# Patient Record
Sex: MALE | Race: BLACK OR AFRICAN AMERICAN | URBAN - METROPOLITAN AREA
[De-identification: names, ages, dates, MRNs, and addresses within clinical notes are randomized per-mention and may not be internally consistent; named-entity substitution may affect disease eponyms.]

---

## 2017-04-13 ENCOUNTER — OFFICE VISIT (OUTPATIENT)
Dept: INTERNAL MEDICINE CLINIC | Age: 47
End: 2017-04-13

## 2017-04-13 VITALS
HEIGHT: 62 IN | WEIGHT: 177 LBS | SYSTOLIC BLOOD PRESSURE: 153 MMHG | HEART RATE: 72 BPM | TEMPERATURE: 98 F | RESPIRATION RATE: 16 BRPM | DIASTOLIC BLOOD PRESSURE: 88 MMHG | BODY MASS INDEX: 32.57 KG/M2 | OXYGEN SATURATION: 95 %

## 2017-04-13 DIAGNOSIS — Z00.00 ROUTINE GENERAL MEDICAL EXAMINATION AT A HEALTH CARE FACILITY: ICD-10-CM

## 2017-04-13 DIAGNOSIS — E78.2 MIXED HYPERLIPIDEMIA: ICD-10-CM

## 2017-04-13 DIAGNOSIS — I10 ESSENTIAL HYPERTENSION: ICD-10-CM

## 2017-04-13 DIAGNOSIS — Z76.89 ENCOUNTER TO ESTABLISH CARE: Primary | ICD-10-CM

## 2017-04-13 DIAGNOSIS — Q53.10 UNDESCENDED RIGHT TESTICLE: ICD-10-CM

## 2017-04-13 DIAGNOSIS — N50.811 TESTICULAR PAIN, RIGHT: ICD-10-CM

## 2017-04-13 DIAGNOSIS — E55.9 VITAMIN D DEFICIENCY: ICD-10-CM

## 2017-04-13 RX ORDER — ATORVASTATIN CALCIUM 10 MG/1
10 TABLET, FILM COATED ORAL DAILY
Qty: 30 TAB | Refills: 5 | Status: CANCELLED | OUTPATIENT
Start: 2017-04-13

## 2017-04-13 NOTE — PATIENT INSTRUCTIONS
High Blood Pressure: Care Instructions  Your Care Instructions  If your blood pressure is usually above 140/90, you have high blood pressure, or hypertension. That means the top number is 140 or higher or the bottom number is 90 or higher, or both. Despite what a lot of people think, high blood pressure usually doesn't cause headaches or make you feel dizzy or lightheaded. It usually has no symptoms. But it does increase your risk for heart attack, stroke, and kidney or eye damage. The higher your blood pressure, the more your risk increases. Your doctor will give you a goal for your blood pressure. Your goal will be based on your health and your age. An example of a goal is to keep your blood pressure below 140/90. Lifestyle changes, such as eating healthy and being active, are always important to help lower blood pressure. You might also take medicine to reach your blood pressure goal.  Follow-up care is a key part of your treatment and safety. Be sure to make and go to all appointments, and call your doctor if you are having problems. It's also a good idea to know your test results and keep a list of the medicines you take. How can you care for yourself at home? Medical treatment  · If you stop taking your medicine, your blood pressure will go back up. You may take one or more types of medicine to lower your blood pressure. Be safe with medicines. Take your medicine exactly as prescribed. Call your doctor if you think you are having a problem with your medicine. · Talk to your doctor before you start taking aspirin every day. Aspirin can help certain people lower their risk of a heart attack or stroke. But taking aspirin isn't right for everyone, because it can cause serious bleeding. · See your doctor regularly. You may need to see the doctor more often at first or until your blood pressure comes down.   · If you are taking blood pressure medicine, talk to your doctor before you take decongestants or anti-inflammatory medicine, such as ibuprofen. Some of these medicines can raise blood pressure. · Learn how to check your blood pressure at home. Lifestyle changes  · Stay at a healthy weight. This is especially important if you put on weight around the waist. Losing even 10 pounds can help you lower your blood pressure. · If your doctor recommends it, get more exercise. Walking is a good choice. Bit by bit, increase the amount you walk every day. Try for at least 30 minutes on most days of the week. You also may want to swim, bike, or do other activities. · Avoid or limit alcohol. Talk to your doctor about whether you can drink any alcohol. · Try to limit how much sodium you eat to less than 2,300 milligrams (mg) a day. Your doctor may ask you to try to eat less than 1,500 mg a day. · Eat plenty of fruits (such as bananas and oranges), vegetables, legumes, whole grains, and low-fat dairy products. · Lower the amount of saturated fat in your diet. Saturated fat is found in animal products such as milk, cheese, and meat. Limiting these foods may help you lose weight and also lower your risk for heart disease. · Do not smoke. Smoking increases your risk for heart attack and stroke. If you need help quitting, talk to your doctor about stop-smoking programs and medicines. These can increase your chances of quitting for good. When should you call for help? Call 911 anytime you think you may need emergency care. This may mean having symptoms that suggest that your blood pressure is causing a serious heart or blood vessel problem. Your blood pressure may be over 180/110. For example, call 911 if:  · You have symptoms of a heart attack. These may include:  ¨ Chest pain or pressure, or a strange feeling in the chest.  ¨ Sweating. ¨ Shortness of breath. ¨ Nausea or vomiting. ¨ Pain, pressure, or a strange feeling in the back, neck, jaw, or upper belly or in one or both shoulders or arms.   ¨ Lightheadedness or sudden weakness. ¨ A fast or irregular heartbeat. · You have symptoms of a stroke. These may include:  ¨ Sudden numbness, tingling, weakness, or loss of movement in your face, arm, or leg, especially on only one side of your body. ¨ Sudden vision changes. ¨ Sudden trouble speaking. ¨ Sudden confusion or trouble understanding simple statements. ¨ Sudden problems with walking or balance. ¨ A sudden, severe headache that is different from past headaches. · You have severe back or belly pain. Do not wait until your blood pressure comes down on its own. Get help right away. Call your doctor now or seek immediate care if:  · Your blood pressure is much higher than normal (such as 180/110 or higher), but you don't have symptoms. · You think high blood pressure is causing symptoms, such as:  ¨ Severe headache. ¨ Blurry vision. Watch closely for changes in your health, and be sure to contact your doctor if:  · Your blood pressure measures 140/90 or higher at least 2 times. That means the top number is 140 or higher or the bottom number is 90 or higher, or both. · You think you may be having side effects from your blood pressure medicine. · Your blood pressure is usually normal, but it goes above normal at least 2 times. Where can you learn more? Go to http://amaris-jay.info/. Enter L118 in the search box to learn more about \"High Blood Pressure: Care Instructions. \"  Current as of: August 8, 2016  Content Version: 11.2  © 3921-8810 [employer]. Care instructions adapted under license by [employer] (which disclaims liability or warranty for this information). If you have questions about a medical condition or this instruction, always ask your healthcare professional. [place] [address] any warranty or liability for your use of this information.        High Cholesterol: Care Instructions  Your Care Instructions  Cholesterol is a type of fat in your blood. It is needed for many body functions, such as making new cells. Cholesterol is made by your body. It also comes from food you eat. High cholesterol means that you have too much of the fat in your blood. This raises your risk of a heart attack and stroke. LDL and HDL are part of your total cholesterol. LDL is the \"bad\" cholesterol. High LDL can raise your risk for heart disease, heart attack, and stroke. HDL is the \"good\" cholesterol. It helps clear bad cholesterol from the body. High HDL is linked with a lower risk of heart disease, heart attack, and stroke. Your cholesterol levels help your doctor find out your risk for having a heart attack or stroke. You and your doctor can talk about whether you need to lower your risk and what treatment is best for you. A heart-healthy lifestyle along with medicines can help lower your cholesterol and your risk. The way you choose to lower your risk will depend on how high your risk is for heart attack and stroke. It will also depend on how you feel about taking medicines. Follow-up care is a key part of your treatment and safety. Be sure to make and go to all appointments, and call your doctor if you are having problems. It's also a good idea to know your test results and keep a list of the medicines you take. How can you care for yourself at home? · Eat a variety of foods every day. Good choices include fruits, vegetables, whole grains (like oatmeal), dried beans and peas, nuts and seeds, soy products (like tofu), and fat-free or low-fat dairy products. · Replace butter, margarine, and hydrogenated or partially hydrogenated oils with olive and canola oils. (Canola oil margarine without trans fat is fine.)  · Replace red meat with fish, poultry, and soy protein (like tofu). · Limit processed and packaged foods like chips, crackers, and cookies. · Bake, broil, or steam foods. Don't hill them. · Be physically active.  Get at least 30 minutes of exercise on most days of the week. Walking is a good choice. You also may want to do other activities, such as running, swimming, cycling, or playing tennis or team sports. · Stay at a healthy weight or lose weight by making the changes in eating and physical activity listed above. Losing just a small amount of weight, even 5 to 10 pounds, can reduce your risk for having a heart attack or stroke. · Do not smoke. When should you call for help? Watch closely for changes in your health, and be sure to contact your doctor if:  · You need help making lifestyle changes. · You have questions about your medicine. Where can you learn more? Go to http://amaris-jay.info/. Enter I671 in the search box to learn more about \"High Cholesterol: Care Instructions. \"  Current as of: January 27, 2016  Content Version: 11.2  © 4581-5948 [employer]. Care instructions adapted under license by [employer] (which disclaims liability or warranty for this information). If you have questions about a medical condition or this instruction, always ask your healthcare professional. [address] any warranty or liability for your use of this information.

## 2017-04-13 NOTE — LETTER
4/20/2017 8:04 AM 
 
Mr. Cat [address] [place] [id number] Dear Tab Hinds: 
 
Please find your most recent results below. Resulted Orders LIPID PANEL Result Value Ref Range Cholesterol, total 214 (H) 100 - 199 mg/dL Triglyceride 93 0 - 149 mg/dL HDL Cholesterol 52 >39 mg/dL VLDL, calculated 19 5 - 40 mg/dL LDL, calculated 143 (H) 0 - 99 mg/dL METABOLIC PANEL, COMPREHENSIVE Result Value Ref Range Glucose 93 65 - 99 mg/dL BUN 7 6 - 24 mg/dL Creatinine 0.81 0.76 - 1.27 mg/dL GFR est non- >59 mL/min/1.73 GFR est  >59 mL/min/1.73  
 BUN/Creatinine ratio 9 9 - 20 Sodium 142 134 - 144 mmol/L Potassium 4.9 3.5 - 5.2 mmol/L Chloride 100 96 - 106 mmol/L  
 CO2 23 18 - 29 mmol/L Calcium 9.9 8.7 - 10.2 mg/dL Protein, total 6.9 6.0 - 8.5 g/dL Albumin 4.5 3.5 - 5.5 g/dL GLOBULIN, TOTAL 2.4 1.5 - 4.5 g/dL A-G Ratio 1.9 1.2 - 2.2 Bilirubin, total 0.3 0.0 - 1.2 mg/dL Alk. phosphatase 70 39 - 117 IU/L  
 AST (SGOT) 24 0 - 40 IU/L  
 ALT (SGPT) 21 0 - 44 IU/L  
   
   
CBC WITH AUTOMATED DIFF Result Value Ref Range WBC 5.9 3.4 - 10.8 x10E3/uL  
 RBC 4.59 4.14 - 5.80 x10E6/uL HGB 13.3 12.6 - 17.7 g/dL HCT 40.5 37.5 - 51.0 % MCV 88 79 - 97 fL  
 MCH 29.0 26.6 - 33.0 pg  
 MCHC 32.8 31.5 - 35.7 g/dL  
 RDW 14.2 12.3 - 15.4 % PLATELET 560 191 - 801 x10E3/uL NEUTROPHILS 66 % Lymphocytes 26 % MONOCYTES 5 % EOSINOPHILS 3 % BASOPHILS 0 %  
 ABS. NEUTROPHILS 3.9 1.4 - 7.0 x10E3/uL Abs Lymphocytes 1.5 0.7 - 3.1 x10E3/uL  
 ABS. MONOCYTES 0.3 0.1 - 0.9 x10E3/uL  
 ABS. EOSINOPHILS 0.2 0.0 - 0.4 x10E3/uL  
 ABS. BASOPHILS 0.0 0.0 - 0.2 x10E3/uL IMMATURE GRANULOCYTES 0 %  
 ABS. IMM. GRANS. 0.0 0.0 - 0.1 x10E3/uL PSA SCREENING (SCREENING) Result Value Ref Range Prostate Specific Ag 0.2 0.0 - 4.0 ng/mL TSH AND FREE T4 Result Value Ref Range TSH 1.280 0.450 - 4.500 uIU/mL T4, Free 1.05 0.82 - 1.77 ng/dL VITAMIN D, 25 HYDROXY Result Value Ref Range VITAMIN D, 25-HYDROXY 24.5 (L) 30.0 - 100.0 ng/mL HEMOGLOBIN A1C WITH EAG Result Value Ref Range Hemoglobin A1c 6.3 (H) 4.8 - 5.6 % Comment:  
            Pre-diabetes: 5.7 - 6.4 Diabetes: >6.4 Glycemic control for adults with diabetes: <7.0 Estimated average glucose 134 mg/dL CVD REPORT  
    
     
     
     
   
   
 
 
RECOMMENDATIONS: 
Your labs showed normal kidney and liver tests, blood counts, thyroid, and prostate blood test. Your diabetes screening showed you have prediabetes, meaning you are at increased risk of diabetes.  
 
 Your total cholesterol was also high. To decrease your risk of getting full-blown diabetes and to lower your cholesterol, work on diet, exercise, and weight loss if you are overweight. For diet, increase fruits, vegetables, and low- fat dairy products, and decrease sweets, soft drinks, juices, and foods high in carbohydrates like white bread, white rice, potatoes, crackers, potato chips. Also decrease fried foods, fast foods, and red meats such as beef, tyler, sausage, hot dogs, and pork. For exercise, aim for 30 minutes of activity 5 days a week. Your vitamin D level is low but improving. Try to get foods high in vitamin D like tuna, mackerel, salmon, milk, yogurt. Please call me if you have any questions: [phone] Sincerely, Jerry Valdez LPN

## 2017-04-13 NOTE — PROGRESS NOTES
Reviewed record  In preparation for visit and have obtained necessary documentation. 1. Have you been to the ER, urgent care clinic since your last visit? Hospitalized since your last visit?no  2. Have you seen or consulted any other health care providers outside of the [address] [place] [address] since your last visit? Include any pap smears or colon screening. No    Patient does not currently have advance directives. Patient given information on advance directives and brochure and printed blank advance directive form made available to patient. Patient is also asked to make copy of directives available to this office for our/[facility] records once advanced directives are completed.

## 2017-04-13 NOTE — PROGRESS NOTES
CC:  Chief Complaint   Patient presents with    Establish Care    Testicle Pain    Elevated Blood Pressure     HISTORY OF PRESENT ILLNESS  Maryellen Abraham is a 56 y.o. male. Presents to [facility]. Previously followed by Dr. Adia Alford and Dale Antonio NP at this clinic. Last seen in 3/15. He has HTN and hyperlipidemia. He is presently on no medication. Today he complains of 2 yr history of testicular pain. Presently mostly on the right side. Constant, 4/10. No known exacerbating or alleviating factors. Cardiovascular Review  The patient has hypertension and hyperlipidemia. States he has never been on blood pressure medication,  Was previously on atorvastatin; tolerated with no side effects. Diet and Lifestyle: not attempting to follow a low fat, low cholesterol diet, not attempting to follow a low sodium diet, no formal exercise but active during the day. Lab review: orders written for new lab studies as appropriate; see orders. Fam Hx: Had a brother who  of a heart attack at age 45     Soc Hx  . Has 2 daughters. Lives alone in a house. Works as a . Never smoker. Drinks beer occasionally. Denies recreational drug use. Drinks occasional energy drinks.     ROS  Constitutional: negative for fevers, chills, night sweats  ENT:   negative for sore throat, nasal congestion, ear pains, hoarseness  Respiratory:  negative for cough, hemoptysis, dyspnea,wheezing  CV:   negative for chest pain, palpitations, lower extremity edema  GI:   negative for heartburn, abd pain, nausea, vomiting, diarrhea, constipation  Genitourinary: negative for frequency, dysuria and hematuria  Integument:  negative for rash and pruritus  Musculoskel: negative for myalgias, arthralgias, back pain, muscle weakness, joint pain  Neurological:  negative for headaches, dizziness, vertigo, gait problems  Behavl/Psych: negative for feelings of anxiety, depression, mood change     Patient Active Problem List   Diagnosis Code    FH: CAD (coronary artery disease) Z82.49    Undescended right testicle Q53.10    Hyperlipidemia E78.5    Vitamin D deficiency E55.9     Past Medical History:   Diagnosis Date    FH: CAD (coronary artery disease) 10/17/2011    Undescended right testicle 10/17/2011     No Known Allergies  Current Outpatient Prescriptions   Medication Sig Dispense Refill    aspirin 81 mg chewable tablet Take 81 mg by mouth daily. On no medications. PHYSICAL EXAM  Visit Vitals    /88    Pulse 72    Temp 98 °F (36.7 °C) (Oral)    Resp 16    Ht 5' 2\" (1.575 m)    Wt 177 lb (80.3 kg)    SpO2 95%    BMI 32.37 kg/m2   8 lb wt gain since 3/6/15. General: Obese, no distress. HEENT:  Head normocephalic/atraumatic, no scleral icterus  Lungs:  Clear to ausculation bilaterally. Good air movement. Heart:  Regular rate and rhythm, normal S1 and S2, no murmur, gallop, or rub  Extremities: No clubbing, cyanosis, or edema. : Undescended right testicle. Normal penis. Neurological: Alert and oriented. Psychiatric: Normal mood and affect. Behavior is normal.         ASSESSMENT AND PLAN    ICD-10-CM ICD-9-CM    1. Encounter to establish care Z76.89 V65.8    2. Routine general medical examination at a health care facility Z00.00 V70.0 LIPID PANEL      METABOLIC PANEL, COMPREHENSIVE      CBC WITH AUTOMATED DIFF      PSA SCREENING (SCREENING)      TSH AND FREE T4      HEMOGLOBIN A1C WITH EAG   3. Essential hypertension I10 401.9    4. Mixed hyperlipidemia E78.2 272.2    5. Testicular pain, right N50.811 608.9 REFERRAL TO UROLOGY   6. Undescended right testicle Q53.10 752.51    7. Vitamin D deficiency E55.9 268.9 VITAMIN D, 25 HYDROXY       Ranjan Colindres was seen today for establish care, testicle pain and elevated blood pressure.     Diagnoses and all orders for this visit:    Encounter to establish care    Routine general medical examination at a health care facility  -     LIPID PANEL  -     METABOLIC PANEL, COMPREHENSIVE  -     CBC WITH AUTOMATED DIFF  -     PSA SCREENING (SCREENING)  -     TSH AND FREE T4  -     HEMOGLOBIN A1C WITH EAG    Essential hypertension  /88 today. He declines starting on BP medication. Wants to try lifestyle modification first. Patient counseled on low-salt, low-cholesterol diet, exercise, and weight loss. Mixed hyperlipidemia    Testicular pain, right  -     REFERRAL TO UROLOGY    Undescended right testicle    Vitamin D deficiency  -     VITAMIN D, 25 HYDROXY      Follow-up Disposition:  Return in about 3 months (around 7/13/2017), or if symptoms worsen or fail to improve, for HTN, hyperlipidemia. Provided patient and/or family with advanced directive information and answered pertinent questions. Encouraged patient to provide a copy of advanced directive to the office when available. I have discussed the diagnosis with the patient and the intended plan as seen in the above orders. Patient is in agreement. The patient has received an after-visit summary and questions were answered concerning future plans. I have discussed medication side effects and warnings with the patient as well.

## 2017-04-13 NOTE — MR AVS SNAPSHOT
Visit Information Date & Time Provider Department Dept. Phone Encounter #  
 4/13/2017 10:00 AM Catrina UlrichBhavya [phone] [id number] Follow-up Instructions Return in about 3 months (around 7/13/2017), or if symptoms worsen or fail to improve, for HTN, hyperlipidemia. Upcoming Health Maintenance Date Due DTaP/Tdap/Td series (2 - Td) 10/17/2021 Allergies as of 4/13/2017  Review Complete On: 4/13/2017 By: Catrina Ulrich MD  
 No Known Allergies Current Immunizations  Reviewed on 3/16/2015 Name Date Influenza Vaccine Split 10/17/2011 TDAP Vaccine 10/17/2011 Not reviewed this visit You Were Diagnosed With   
  
 Codes Comments Encounter to establish care    -  Primary ICD-10-CM: Z76.89 
ICD-9-CM: V65.8 Routine general medical examination at a health care facility     ICD-10-CM: Z00.00 ICD-9-CM: V70.0 Essential hypertension     ICD-10-CM: I10 
ICD-9-CM: 401.9 Mixed hyperlipidemia     ICD-10-CM: E78.2 ICD-9-CM: 272.2 Testicular pain, right     ICD-10-CM: N50.811 ICD-9-CM: 608.9 Undescended right testicle     ICD-10-CM: Q53.10 ICD-9-CM: 752.51 Vitamin D deficiency     ICD-10-CM: E55.9 ICD-9-CM: 268.9 Vitals BP Pulse Temp Resp Height(growth percentile) Weight(growth percentile) 153/88 72 98 °F (36.7 °C) (Oral) 16 5' 2\" (1.575 m) 177 lb (80.3 kg) SpO2 BMI Smoking Status 95% 32.37 kg/m2 Never Smoker Vitals History BMI and BSA Data Body Mass Index Body Surface Area  
 32.37 kg/m 2 1.87 m 2 Preferred Pharmacy Pharmacy Name Phone [id number] [place] [address] [phone] Your Updated Medication List  
  
   
This list is accurate as of: 4/13/17 10:30 AM.  Always use your most recent med list.  
  
  
  
  
 aspirin 81 mg chewable tablet Take 81 mg by mouth daily. We Performed the Following CBC WITH AUTOMATED DIFF [13600 CPT(R)] HEMOGLOBIN A1C WITH EAG [68843 CPT(R)] LIPID PANEL [84699 CPT(R)] METABOLIC PANEL, COMPREHENSIVE [68998 CPT(R)] PSA SCREENING (SCREENING) [ [facility]] REFERRAL TO UROLOGY [BHO565 Custom] Comments:  
 Chronic testicular pain. Undescended right testicle with ED. TSH AND FREE T4 [89507 CPT(R)] VITAMIN D, 25 HYDROXY [id number] CPT(R)] Follow-up Instructions Return in about 3 months (around 7/13/2017), or if symptoms worsen or fail to improve, for HTN, hyperlipidemia. Referral Information Referral ID Referred By Referred To  
  
 [id number] [facility]ph[facility] [address], [address] [facility] Visits Status Start Date End Date 1 New Request 4/13/17 4/13/18 If your referral has a status of pending review or denied, additional information will be sent to support the outcome of this decision. Patient Instructions High Blood Pressure: Care Instructions Your Care Instructions If your blood pressure is usually above 140/90, you have high blood pressure, or hypertension. That means the top number is 140 or higher or the bottom number is 90 or higher, or both. Despite what a lot of people think, high blood pressure usually doesn't cause headaches or make you feel dizzy or lightheaded. It usually has no symptoms. But it does increase your risk for heart attack, stroke, and kidney or eye damage. The higher your blood pressure, the more your risk increases. Your doctor will give you a goal for your blood pressure. Your goal will be based on your health and your age. An example of a goal is to keep your blood pressure below 140/90. Lifestyle changes, such as eating healthy and being active, are always important to help lower blood pressure.  You might also take medicine to reach your blood pressure goal. 
 Follow-up care is a key part of your treatment and safety. Be sure to make and go to all appointments, and call your doctor if you are having problems. It's also a good idea to know your test results and keep a list of the medicines you take. How can you care for yourself at home? Medical treatment · If you stop taking your medicine, your blood pressure will go back up. You may take one or more types of medicine to lower your blood pressure. Be safe with medicines. Take your medicine exactly as prescribed. Call your doctor if you think you are having a problem with your medicine. · Talk to your doctor before you start taking aspirin every day. Aspirin can help certain people lower their risk of a heart attack or stroke. But taking aspirin isn't right for everyone, because it can cause serious bleeding. · See your doctor regularly. You may need to see the doctor more often at first or until your blood pressure comes down. · If you are taking blood pressure medicine, talk to your doctor before you take decongestants or anti-inflammatory medicine, such as ibuprofen. Some of these medicines can raise blood pressure. · Learn how to check your blood pressure at home. Lifestyle changes · Stay at a healthy weight. This is especially important if you put on weight around the waist. Losing even 10 pounds can help you lower your blood pressure. · If your doctor recommends it, get more exercise. Walking is a good choice. Bit by bit, increase the amount you walk every day. Try for at least 30 minutes on most days of the week. You also may want to swim, bike, or do other activities. · Avoid or limit alcohol. Talk to your doctor about whether you can drink any alcohol. · Try to limit how much sodium you eat to less than 2,300 milligrams (mg) a day. Your doctor may ask you to try to eat less than 1,500 mg a day.  
· Eat plenty of fruits (such as bananas and oranges), vegetables, legumes, whole grains, and low-fat dairy products. · Lower the amount of saturated fat in your diet. Saturated fat is found in animal products such as milk, cheese, and meat. Limiting these foods may help you lose weight and also lower your risk for heart disease. · Do not smoke. Smoking increases your risk for heart attack and stroke. If you need help quitting, talk to your doctor about stop-smoking programs and medicines. These can increase your chances of quitting for good. When should you call for help? Call 911 anytime you think you may need emergency care. This may mean having symptoms that suggest that your blood pressure is causing a serious heart or blood vessel problem. Your blood pressure may be over 180/110. For example, call 911 if: 
· You have symptoms of a heart attack. These may include: ¨ Chest pain or pressure, or a strange feeling in the chest. 
¨ Sweating. ¨ Shortness of breath. ¨ Nausea or vomiting. ¨ Pain, pressure, or a strange feeling in the back, neck, jaw, or upper belly or in one or both shoulders or arms. ¨ Lightheadedness or sudden weakness. ¨ A fast or irregular heartbeat. · You have symptoms of a stroke. These may include: 
¨ Sudden numbness, tingling, weakness, or loss of movement in your face, arm, or leg, especially on only one side of your body. ¨ Sudden vision changes. ¨ Sudden trouble speaking. ¨ Sudden confusion or trouble understanding simple statements. ¨ Sudden problems with walking or balance. ¨ A sudden, severe headache that is different from past headaches. · You have severe back or belly pain. Do not wait until your blood pressure comes down on its own. Get help right away. Call your doctor now or seek immediate care if: 
· Your blood pressure is much higher than normal (such as 180/110 or higher), but you don't have symptoms. · You think high blood pressure is causing symptoms, such as: ¨ Severe headache. ¨ Blurry vision. Watch closely for changes in your health, and be sure to contact your doctor if: 
· Your blood pressure measures 140/90 or higher at least 2 times. That means the top number is 140 or higher or the bottom number is 90 or higher, or both. · You think you may be having side effects from your blood pressure medicine. · Your blood pressure is usually normal, but it goes above normal at least 2 times. Where can you learn more? Go to http://amaris-jay.info/. Enter H619 in the search box to learn more about \"High Blood Pressure: Care Instructions. \" Current as of: August 8, 2016 Content Version: 11.2 © 0871-3026 [employer]. Care instructions adapted under license by [employer] (which disclaims liability or warranty for this information). If you have questions about a medical condition or this instruction, always ask your healthcare professional. [place] [address] any warranty or liability for your use of this information. High Cholesterol: Care Instructions Your Care Instructions Cholesterol is a type of fat in your blood. It is needed for many body functions, such as making new cells. Cholesterol is made by your body. It also comes from food you eat. High cholesterol means that you have too much of the fat in your blood. This raises your risk of a heart attack and stroke. LDL and HDL are part of your total cholesterol. LDL is the \"bad\" cholesterol. High LDL can raise your risk for heart disease, heart attack, and stroke. HDL is the \"good\" cholesterol. It helps clear bad cholesterol from the body. High HDL is linked with a lower risk of heart disease, heart attack, and stroke. Your cholesterol levels help your doctor find out your risk for having a heart attack or stroke. You and your doctor can talk about whether you need to lower your risk and what treatment is best for you.  
A heart-healthy lifestyle along with medicines can help lower your cholesterol and your risk. The way you choose to lower your risk will depend on how high your risk is for heart attack and stroke. It will also depend on how you feel about taking medicines. Follow-up care is a key part of your treatment and safety. Be sure to make and go to all appointments, and call your doctor if you are having problems. It's also a good idea to know your test results and keep a list of the medicines you take. How can you care for yourself at home? · Eat a variety of foods every day. Good choices include fruits, vegetables, whole grains (like oatmeal), dried beans and peas, nuts and seeds, soy products (like tofu), and fat-free or low-fat dairy products. · Replace butter, margarine, and hydrogenated or partially hydrogenated oils with olive and canola oils. (Canola oil margarine without trans fat is fine.) · Replace red meat with fish, poultry, and soy protein (like tofu). · Limit processed and packaged foods like chips, crackers, and cookies. · Bake, broil, or steam foods. Don't hill them. · Be physically active. Get at least 30 minutes of exercise on most days of the week. Walking is a good choice. You also may want to do other activities, such as running, swimming, cycling, or playing tennis or team sports. · Stay at a healthy weight or lose weight by making the changes in eating and physical activity listed above. Losing just a small amount of weight, even 5 to 10 pounds, can reduce your risk for having a heart attack or stroke. · Do not smoke. When should you call for help? Watch closely for changes in your health, and be sure to contact your doctor if: 
· You need help making lifestyle changes. · You have questions about your medicine. Where can you learn more? Go to http://amaris-jay.info/. Enter Z975 in the search box to learn more about \"High Cholesterol: Care Instructions. \" Current as of: January 27, 2016 Content Version: 11.2 © 2236-1160 [employer]. Care instructions adapted under license by [employer] (which disclaims liability or warranty for this information). If you have questions about a medical condition or this instruction, always ask your healthcare professional. [address] any warranty or liability for your use of this information. Introducing [facility]! [employer] introduces [employer] patient portal. Now you can access parts of your medical record, email your doctor's office, and request medication refills online. 1. In your internet browser, go to [email or web address] 2. Click on the First Time User? Click Here link in the Sign In box. You will see the New Member Sign Up page. 3. Enter your HedgeCo Access Code exactly as it appears below. You will not need to use this code after youve completed the sign-up process. If you do not sign up before the expiration date, you must request a new code. · [employer]edgeCo Access Code: [email or web address] Expires: 7/12/2017 10:30 AM 
 
4. Enter the last four digits of your Social Security Number (xxxx) and Date of Birth (mm/dd/yyyy) as indicated and click Submit. You will be taken to the next sign-up page. 5. Create a HedgeCo ID. This will be your HedgeCo login ID and cannot be changed, so think of one that is secure and easy to remember. 6. Create a H[employer] password. You can change your password at any time. 7. Enter your Password Reset Question and Answer. This can be used at a later time if you forget your password. 8. Enter your e-mail address. You will receive e-mail notification when new information is available in [address] 9. Click Sign Up. You can now view and download portions of your medical record. 10. Click the Download Summary menu link to download a portable copy of your medical information.  
 
If you have questions, please visit the Frequently Asked Questions section of the Invrep. Remember, DailyCredhart is NOT to be used for urgent needs. For medical emergencies, dial 911. Now available from your iPhone and Android! Please provide this summary of care documentation to your next provider. Your primary care clinician is listed as Chris Aguilar. If you have any questions after today's visit, please call [phone].

## 2017-04-14 LAB
25(OH)D3+25(OH)D2 SERPL-MCNC: 24.5 NG/ML (ref 30–100)
ALBUMIN SERPL-MCNC: 4.5 G/DL (ref 3.5–5.5)
ALBUMIN/GLOB SERPL: 1.9 {RATIO} (ref 1.2–2.2)
ALP SERPL-CCNC: 70 IU/L (ref 39–117)
ALT SERPL-CCNC: 21 IU/L (ref 0–44)
AST SERPL-CCNC: 24 IU/L (ref 0–40)
BASOPHILS # BLD AUTO: 0 X10E3/UL (ref 0–0.2)
BASOPHILS NFR BLD AUTO: 0 %
BILIRUB SERPL-MCNC: 0.3 MG/DL (ref 0–1.2)
BUN SERPL-MCNC: 7 MG/DL (ref 6–24)
BUN/CREAT SERPL: 9 (ref 9–20)
CALCIUM SERPL-MCNC: 9.9 MG/DL (ref 8.7–10.2)
CHLORIDE SERPL-SCNC: 100 MMOL/L (ref 96–106)
CHOLEST SERPL-MCNC: 214 MG/DL (ref 100–199)
CO2 SERPL-SCNC: 23 MMOL/L (ref 18–29)
CREAT SERPL-MCNC: 0.81 MG/DL (ref 0.76–1.27)
EOSINOPHIL # BLD AUTO: 0.2 X10E3/UL (ref 0–0.4)
EOSINOPHIL NFR BLD AUTO: 3 %
ERYTHROCYTE [DISTWIDTH] IN BLOOD BY AUTOMATED COUNT: 14.2 % (ref 12.3–15.4)
EST. AVERAGE GLUCOSE BLD GHB EST-MCNC: 134 MG/DL
GLOBULIN SER CALC-MCNC: 2.4 G/DL (ref 1.5–4.5)
GLUCOSE SERPL-MCNC: 93 MG/DL (ref 65–99)
HBA1C MFR BLD: 6.3 % (ref 4.8–5.6)
HCT VFR BLD AUTO: 40.5 % (ref 37.5–51)
HDLC SERPL-MCNC: 52 MG/DL
HGB BLD-MCNC: 13.3 G/DL (ref 12.6–17.7)
IMM GRANULOCYTES # BLD: 0 X10E3/UL (ref 0–0.1)
IMM GRANULOCYTES NFR BLD: 0 %
INTERPRETATION, 910389: NORMAL
LDLC SERPL CALC-MCNC: 143 MG/DL (ref 0–99)
LYMPHOCYTES # BLD AUTO: 1.5 X10E3/UL (ref 0.7–3.1)
LYMPHOCYTES NFR BLD AUTO: 26 %
MCH RBC QN AUTO: 29 PG (ref 26.6–33)
MCHC RBC AUTO-ENTMCNC: 32.8 G/DL (ref 31.5–35.7)
MCV RBC AUTO: 88 FL (ref 79–97)
MONOCYTES # BLD AUTO: 0.3 X10E3/UL (ref 0.1–0.9)
MONOCYTES NFR BLD AUTO: 5 %
NEUTROPHILS # BLD AUTO: 3.9 X10E3/UL (ref 1.4–7)
NEUTROPHILS NFR BLD AUTO: 66 %
PLATELET # BLD AUTO: 290 X10E3/UL (ref 150–379)
POTASSIUM SERPL-SCNC: 4.9 MMOL/L (ref 3.5–5.2)
PROT SERPL-MCNC: 6.9 G/DL (ref 6–8.5)
PSA SERPL-MCNC: 0.2 NG/ML (ref 0–4)
RBC # BLD AUTO: 4.59 X10E6/UL (ref 4.14–5.8)
SODIUM SERPL-SCNC: 142 MMOL/L (ref 134–144)
T4 FREE SERPL-MCNC: 1.05 NG/DL (ref 0.82–1.77)
TRIGL SERPL-MCNC: 93 MG/DL (ref 0–149)
TSH SERPL DL<=0.005 MIU/L-ACNC: 1.28 UIU/ML (ref 0.45–4.5)
VLDLC SERPL CALC-MCNC: 19 MG/DL (ref 5–40)
WBC # BLD AUTO: 5.9 X10E3/UL (ref 3.4–10.8)

## 2017-04-18 PROBLEM — R73.03 PREDIABETES: Status: ACTIVE | Noted: 2017-04-18

## 2017-04-19 NOTE — PROGRESS NOTES
Your labs showed normal kidney and liver tests, blood counts, thyroid, and prostate blood test. Your diabetes screening showed you have prediabetes, meaning you are at increased risk of diabetes. Your total cholesterol was also high. To decrease your risk of getting full-blown diabetes and to lower your cholesterol, work on diet, exercise, and weight loss if you are overweight. For diet, increase fruits, vegetables, and low- fat dairy products, and decrease sweets, soft drinks, juices, and foods high in carbohydrates like white bread, white rice, potatoes, crackers, potato chips. Also decrease fried foods, fast foods, and red meats such as beef, tyler, sausage, hot dogs, and pork. For exercise, aim for 30 minutes of activity 5 days a week. Your vitamin D level is low but improving. Try to get foods high in vitamin D like tuna, mackerel, salmon, milk, yogurt.

## 2017-04-24 NOTE — PROGRESS NOTES
Spoke with patient and after verifying name and date of birth of patient gave him test results per Dr. Sal Phoenix. Patient stated understanding.

## 2017-04-27 PROBLEM — E29.1 HYPOGONADISM IN MALE: Status: ACTIVE | Noted: 2017-04-27

## 2017-04-27 PROBLEM — N52.9 ERECTILE DYSFUNCTION: Status: ACTIVE | Noted: 2017-04-27

## 2017-04-27 PROBLEM — Z80.42 FAMILY HISTORY OF PROSTATE CANCER: Status: ACTIVE | Noted: 2017-04-27

## 2017-07-24 ENCOUNTER — OFFICE VISIT (OUTPATIENT)
Dept: INTERNAL MEDICINE CLINIC | Age: 47
End: 2017-07-24

## 2017-07-24 VITALS
DIASTOLIC BLOOD PRESSURE: 83 MMHG | SYSTOLIC BLOOD PRESSURE: 130 MMHG | TEMPERATURE: 98.5 F | BODY MASS INDEX: 31.28 KG/M2 | HEART RATE: 76 BPM | WEIGHT: 170 LBS | HEIGHT: 62 IN | RESPIRATION RATE: 16 BRPM | OXYGEN SATURATION: 96 %

## 2017-07-24 DIAGNOSIS — Z11.3 SCREEN FOR SEXUALLY TRANSMITTED DISEASES: ICD-10-CM

## 2017-07-24 DIAGNOSIS — E78.2 MIXED HYPERLIPIDEMIA: ICD-10-CM

## 2017-07-24 DIAGNOSIS — R73.03 PREDIABETES: ICD-10-CM

## 2017-07-24 DIAGNOSIS — E29.1 HYPOGONADISM IN MALE: ICD-10-CM

## 2017-07-24 DIAGNOSIS — N52.9 ERECTILE DYSFUNCTION, UNSPECIFIED ERECTILE DYSFUNCTION TYPE: ICD-10-CM

## 2017-07-24 DIAGNOSIS — I10 ESSENTIAL HYPERTENSION: Primary | ICD-10-CM

## 2017-07-24 NOTE — PROGRESS NOTES
Reviewed record  In preparation for visit and have obtained necessary documentation. 1. Have you been to the ER, urgent care clinic since your last visit? Hospitalized since your last visit?no  2. Have you seen or consulted any other health care providers outside of the [facility] since your last visit? Include any pap smears or colon screening. Saw urologist Dr Rosaura Todd  Advanced directives: Patient has been given information on advanced directives at a previous visit. Patients vital signs discussed with physician.

## 2017-07-24 NOTE — PROGRESS NOTES
CC:   Chief Complaint   Patient presents with    Hypertension    Cholesterol Problem    Other     wants STD test       HISTORY OF PRESENT ILLNESS  Mercedes Gotti is a 56 y.o. male. Presents for 3 month follow up evaluation. He has HTN, hyperlipidemia, hypogonadism (absent left testicle, atrophic right testicle), and ED. He has no complaints today. Testicular pain resolved. Saw Dr. Farrah Meade; started on testosterone injections, has received 3 injections. Received 3 testosterone injections. Cardiovascular Review  The patient has hypertension and hyperlipidemia. On no medications for either. States he has never been on blood pressure medication,  Was previously on atorvastatin; tolerated with no side effects. Diet and Lifestyle: not attempting to follow a low salt, low fat, low cholesterol diet.  Exercises regularly by riding bicycle and much walking.      Soc Hx  . Has 2 daughters. Lives alone in a house. Works as a . Never smoker. Drinks beer occasionally. Denies recreational drug use. Drinks occasional energy drinks.     ROS  Constitutional: negative for fevers, chills, night sweats  ENT:   negative for sore throat, nasal congestion, ear pains, hoarseness  Respiratory:  negative for cough, hemoptysis, dyspnea,wheezing  CV:   negative for chest pain, palpitations, lower extremity edema  GI:   negative for heartburn, abd pain, nausea, vomiting, diarrhea, constipation  Genitourinary: negative for frequency, dysuria and hematuria  Integument:  negative for rash and pruritus  Musculoskel: negative for myalgias, arthralgias, back pain, muscle weakness, joint pain  Neurological:  negative for headaches, dizziness, vertigo, gait problems  Behavl/Psych: negative for feelings of anxiety, depression, mood change     Patient Active Problem List   Diagnosis Code    FH: CAD (coronary artery disease) Z82.49    Hyperlipidemia E78.5    Vitamin D deficiency E55.9    Prediabetes R73.03    Hypogonadism in male E29.1    Erectile dysfunction N52.9    Family history of prostate cancer Z80.42     Past Medical History:   Diagnosis Date    FH: CAD (coronary artery disease) 10/17/2011    Undescended right testicle 10/17/2011     No Known Allergies     Current Outpatient Prescriptions   Medication Sig Dispense Refill    aspirin 81 mg chewable tablet Take 81 mg by mouth daily. PHYSICAL EXAM  Visit Vitals    /83    Pulse 76    Temp 98.5 °F (36.9 °C) (Oral)    Resp 16    Ht 5' 2\" (1.575 m)    Wt 170 lb (77.1 kg)    SpO2 96%    BMI 31.09 kg/m2       General: Obese, no distress. HEENT:  Head normocephalic/atraumatic, no scleral icterus. Lungs:  Clear to ausculation bilaterally. Good air movement. Heart:  Regular rate and rhythm, normal S1 and S2, no murmur, gallop, or rub  Extremities: No clubbing, cyanosis, or edema. Neurological: Alert and oriented. Psychiatric: Normal mood and affect. Behavior is normal.     Results for orders placed or performed in visit on 04/13/17   LIPID PANEL   Result Value Ref Range    Cholesterol, total 214 (H) 100 - 199 mg/dL    Triglyceride 93 0 - 149 mg/dL    HDL Cholesterol 52 >39 mg/dL    VLDL, calculated 19 5 - 40 mg/dL    LDL, calculated 143 (H) 0 - 99 mg/dL   METABOLIC PANEL, COMPREHENSIVE   Result Value Ref Range    Glucose 93 65 - 99 mg/dL    BUN 7 6 - 24 mg/dL    Creatinine 0.81 0.76 - 1.27 mg/dL    GFR est non- >59 mL/min/1.73    GFR est  >59 mL/min/1.73    BUN/Creatinine ratio 9 9 - 20    Sodium 142 134 - 144 mmol/L    Potassium 4.9 3.5 - 5.2 mmol/L    Chloride 100 96 - 106 mmol/L    CO2 23 18 - 29 mmol/L    Calcium 9.9 8.7 - 10.2 mg/dL    Protein, total 6.9 6.0 - 8.5 g/dL    Albumin 4.5 3.5 - 5.5 g/dL    GLOBULIN, TOTAL 2.4 1.5 - 4.5 g/dL    A-G Ratio 1.9 1.2 - 2.2    Bilirubin, total 0.3 0.0 - 1.2 mg/dL    Alk.  phosphatase 70 39 - 117 IU/L    AST (SGOT) 24 0 - 40 IU/L    ALT (SGPT) 21 0 - 44 IU/L   CBC WITH AUTOMATED DIFF   Result Value Ref Range    WBC 5.9 3.4 - 10.8 x10E3/uL    RBC 4.59 4.14 - 5.80 x10E6/uL    HGB 13.3 12.6 - 17.7 g/dL    HCT 40.5 37.5 - 51.0 %    MCV 88 79 - 97 fL    MCH 29.0 26.6 - 33.0 pg    MCHC 32.8 31.5 - 35.7 g/dL    RDW 14.2 12.3 - 15.4 %    PLATELET 596 152 - 969 x10E3/uL    NEUTROPHILS 66 %    Lymphocytes 26 %    MONOCYTES 5 %    EOSINOPHILS 3 %    BASOPHILS 0 %    ABS. NEUTROPHILS 3.9 1.4 - 7.0 x10E3/uL    Abs Lymphocytes 1.5 0.7 - 3.1 x10E3/uL    ABS. MONOCYTES 0.3 0.1 - 0.9 x10E3/uL    ABS. EOSINOPHILS 0.2 0.0 - 0.4 x10E3/uL    ABS. BASOPHILS 0.0 0.0 - 0.2 x10E3/uL    IMMATURE GRANULOCYTES 0 %    ABS. IMM. GRANS. 0.0 0.0 - 0.1 x10E3/uL   PSA SCREENING (SCREENING)   Result Value Ref Range    Prostate Specific Ag 0.2 0.0 - 4.0 ng/mL   TSH AND FREE T4   Result Value Ref Range    TSH 1.280 0.450 - 4.500 uIU/mL    T4, Free 1.05 0.82 - 1.77 ng/dL   VITAMIN D, 25 HYDROXY   Result Value Ref Range    VITAMIN D, 25-HYDROXY 24.5 (L) 30.0 - 100.0 ng/mL   HEMOGLOBIN A1C WITH EAG   Result Value Ref Range    Hemoglobin A1c 6.3 (H) 4.8 - 5.6 %    Estimated average glucose 134 mg/dL   CVD REPORT   Result Value Ref Range    INTERPRETATION Note          ASSESSMENT AND PLAN    ICD-10-CM ICD-9-CM    1. Essential hypertension I10 401.9    2. Mixed hyperlipidemia E78.2 272.2 LIPID PANEL   3. Prediabetes R73.03 790.29 HEMOGLOBIN A1C WITH EAG   4. Hypogonadism in male E29.1 257.2    5. Erectile dysfunction, unspecified erectile dysfunction type N52.9 607.84    6. Screen for sexually transmitted diseases Z11.3 V74.5 HIV 1/2 AG/AB, 4TH GENERATION,W RFLX CONFIRM      CT/NG/T.VAGINALIS AMPLIFICATION       Daryn Groves was seen today for hypertension, cholesterol problem and other. He requests STD testing. Diagnoses and all orders for this visit:    Essential hypertension  Controlled by diet alone. /83 today.     Mixed hyperlipidemia  -     LIPID PANEL    Prediabetes  -     HEMOGLOBIN A1C WITH EAG    Hypogonadism in male/Erectile dysfunction, unspecified erectile dysfunction type  Continue to follow with Dr. James Zepeda. Screen for sexually transmitted diseases  -     HIV 1/2 AG/AB, 4TH GENERATION,W RFLX CONFIRM  -     CT/NG/T.VAGINALIS AMPLIFICATION      Follow-up Disposition:  Return in about 3 months (around 10/24/2017), or if symptoms worsen or fail to improve, for Prediabetes, hyperlipidemia. Provided patient and/or family with advanced directive information and answered pertinent questions. Encouraged patient to provide a copy of advanced directive to the office when available. I have discussed the diagnosis with the patient and the intended plan as seen in the above orders. Patient is in agreement. The patient has received an after-visit summary and questions were answered concerning future plans. I have discussed medication side effects and warnings with the patient as well.

## 2017-07-24 NOTE — MR AVS SNAPSHOT
Visit Information Date & Time Provider Department Dept. Phone Encounter #  
 7/24/2017  8:10 AM [facility] [phone] [id number] Follow-up Instructions Return in about 3 months (around 10/24/2017), or if symptoms worsen or fail to improve, for Prediabetes, hyperlipidemia. Upcoming Health Maintenance Date Due INFLUENZA AGE 9 TO ADULT 8/1/2017 DTaP/Tdap/Td series (2 - Td) 10/17/2021 Allergies as of 7/24/2017  Review Complete On: 7/24/2017 By: Toney Driver MD  
 No Known Allergies Current Immunizations  Reviewed on 3/16/2015 Name Date Influenza Vaccine Split 10/17/2011 TDAP Vaccine 10/17/2011 Not reviewed this visit You Were Diagnosed With   
  
 Codes Comments Essential hypertension    -  Primary ICD-10-CM: I10 
ICD-9-CM: 401.9 Mixed hyperlipidemia     ICD-10-CM: E78.2 ICD-9-CM: 272.2 Prediabetes     ICD-10-CM: R73.03 
ICD-9-CM: 790.29 Hypogonadism in male     ICD-10-CM: E29.1 ICD-9-CM: 257.2 Erectile dysfunction, unspecified erectile dysfunction type     ICD-10-CM: N52.9 ICD-9-CM: 607.84 Screen for sexually transmitted diseases     ICD-10-CM: Z11.3 ICD-9-CM: V74.5 Vitals BP Pulse Temp Resp Height(growth percentile) Weight(growth percentile) 130/83 76 98.5 °F (36.9 °C) (Oral) 16 5' 2\" (1.575 m) 170 lb (77.1 kg) SpO2 BMI Smoking Status 96% 31.09 kg/m2 Never Smoker Vitals History BMI and BSA Data Body Mass Index Body Surface Area 31.09 kg/m 2 1.84 m 2 Preferred Pharmacy Pharmacy Name Phone [id number] [place] [address] [phone] Your Updated Medication List  
  
   
This list is accurate as of: 7/24/17  8:51 AM.  Always use your most recent med list.  
  
  
  
  
 aspirin 81 mg chewable tablet Take 81 mg by mouth daily. We Performed the Following CT/NG/T.VAGINALIS AMPLIFICATION [id number] CPT(R)] HEMOGLOBIN A1C WITH EAG [44370 CPT(R)] HIV 1/2 AG/AB, 4TH GENERATION,W RFLX CONFIRM [[id number] Custom] LIPID PANEL [56947 CPT(R)] Follow-up Instructions Return in about 3 months (around 10/24/2017), or if symptoms worsen or fail to improve, for Prediabetes, hyperlipidemia. Patient Instructions Prediabetes: Care Instructions Your Care Instructions Prediabetes is a warning sign that you are at risk for getting type 2 diabetes. It means that your blood sugar is higher than it should be. The food you eat turns into sugar, which your body uses for energy. Normally, an organ called the pancreas makes insulin, which allows the sugar in your blood to get into your body's cells. But when your body can't use insulin the right way, the sugar doesn't move into cells. It stays in your blood instead. This is called insulin resistance. The buildup of sugar in the blood causes prediabetes. The good news is that lifestyle changes may help you get your blood sugar back to normal and help you avoid or delay diabetes. Follow-up care is a key part of your treatment and safety. Be sure to make and go to all appointments, and call your doctor if you are having problems. It's also a good idea to know your test results and keep a list of the medicines you take. How can you care for yourself at home? · Watch your weight. A healthy weight helps your body use insulin properly. · Limit the amount of calories, sweets, and unhealthy fat you eat. Ask your doctor if you should see a dietitian. A registered dietitian can help you create meal plans that fit your lifestyle. · Get at least 30 minutes of exercise on most days of the week. Exercise helps control your blood sugar. It also helps you maintain a healthy weight. Walking is a good choice. You also may want to do other activities, such as running, swimming, cycling, or playing tennis or team sports. · Do not smoke. Smoking can make prediabetes worse. If you need help quitting, talk to your doctor about stop-smoking programs and medicines. These can increase your chances of quitting for good. · If your doctor prescribed medicines, take them exactly as prescribed. Call your doctor if you think you are having a problem with your medicine. You will get more details on the specific medicines your doctor prescribes. When should you call for help? Watch closely for changes in your health, and be sure to contact your doctor if: 
· You have any symptoms of diabetes. These may include: ¨ Being thirsty more often. ¨ Urinating more. ¨ Being hungrier. ¨ Losing weight. ¨ Being very tired. ¨ Having blurry vision. · You have a wound that will not heal. 
· You have an infection that will not go away. · You have problems with your blood pressure. · You want more information about diabetes and how you can keep from getting it. Where can you learn more? Go to http://amaris-jay.info/. Enter I222 in the search box to learn more about \"Prediabetes: Care Instructions. \" Current as of: March 13, 2017 Content Version: 11.3 © 9643-4849 [employer]. Care instructions adapted under license by [employer] (which disclaims liability or warranty for this information). If you have questions about a medical condition or this instruction, always ask your healthcare professional. [address] any warranty or liability for your use of this information. High Cholesterol: Care Instructions Your Care Instructions Cholesterol is a type of fat in your blood. It is needed for many body functions, such as making new cells. Cholesterol is made by your body. It also comes from food you eat. High cholesterol means that you have too much of the fat in your blood. This raises your risk of a heart attack and stroke. LDL and HDL are part of your total cholesterol. LDL is the \"bad\" cholesterol. High LDL can raise your risk for heart disease, heart attack, and stroke. HDL is the \"good\" cholesterol. It helps clear bad cholesterol from the body. High HDL is linked with a lower risk of heart disease, heart attack, and stroke. Your cholesterol levels help your doctor find out your risk for having a heart attack or stroke. You and your doctor can talk about whether you need to lower your risk and what treatment is best for you. A heart-healthy lifestyle along with medicines can help lower your cholesterol and your risk. The way you choose to lower your risk will depend on how high your risk is for heart attack and stroke. It will also depend on how you feel about taking medicines. Follow-up care is a key part of your treatment and safety. Be sure to make and go to all appointments, and call your doctor if you are having problems. It's also a good idea to know your test results and keep a list of the medicines you take. How can you care for yourself at home? · Eat a variety of foods every day. Good choices include fruits, vegetables, whole grains (like oatmeal), dried beans and peas, nuts and seeds, soy products (like tofu), and fat-free or low-fat dairy products. · Replace butter, margarine, and hydrogenated or partially hydrogenated oils with olive and canola oils. (Canola oil margarine without trans fat is fine.) · Replace red meat with fish, poultry, and soy protein (like tofu). · Limit processed and packaged foods like chips, crackers, and cookies. · Bake, broil, or steam foods. Don't hill them. · Be physically active. Get at least 30 minutes of exercise on most days of the week. Walking is a good choice. You also may want to do other activities, such as running, swimming, cycling, or playing tennis or team sports.  
· Stay at a healthy weight or lose weight by making the changes in eating and physical activity listed above. Losing just a small amount of weight, even 5 to 10 pounds, can reduce your risk for having a heart attack or stroke. · Do not smoke. When should you call for help? Watch closely for changes in your health, and be sure to contact your doctor if: 
· You need help making lifestyle changes. · You have questions about your medicine. Where can you learn more? Go to http://amaris-jay.info/. Enter Z617 in the search box to learn more about \"High Cholesterol: Care Instructions. \" Current as of: April 3, 2017 Content Version: 11.3 © 9267-9287 [employer]. Care instructions adapted under license by [employer] (which disclaims liability or warranty for this information). If you have questions about a medical condition or this instruction, always ask your healthcare professional. [address] any warranty or liability for your use of this information. Introducing [facility]! [employer] introduces xCloud patient portal. Now you can access parts of your medical record, email your doctor's office, and request medication refills online. 1. In your internet browser, go to [email or web address]. [email or web address] 2. Click on the First Time User? Click Here link in the Sign In box. You will see the New Member Sign Up page. 3. Enter your xCloud Access Code exactly as it appears below. You will not need to use this code after youve completed the sign-up process. If you do not sign up before the expiration date, you must request a new code. · xCloud Access Code: [email or web address] Expires: 10/22/2017  8:37 AM 
 
4. Enter the last four digits of your Social Security Number (xxxx) and Date of Birth (mm/dd/yyyy) as indicated and click Submit. You will be taken to the next sign-up page. 5. Create a xCloud ID.  This will be your xCloud login ID and cannot be changed, so think of one that is secure and easy to remember. 6. Create a meebee password. You can change your password at any time. 7. Enter your Password Reset Question and Answer. This can be used at a later time if you forget your password. 8. Enter your e-mail address. You will receive e-mail notification when new information is available in [address] 9. Click Sign Up. You can now view and download portions of your medical record. 10. Click the Download Summary menu link to download a portable copy of your medical information. If you have questions, please visit the Frequently Asked Questions section of the meebee website. Remember, meebee is NOT to be used for urgent needs. For medical emergencies, dial 911. Now available from your iPhone and Android! Please provide this summary of care documentation to your next provider. Your primary care clinician is listed as Bang Magallanes. If you have any questions after today's visit, please call [phone].

## 2017-07-24 NOTE — PATIENT INSTRUCTIONS
Prediabetes: Care Instructions  Your Care Instructions  Prediabetes is a warning sign that you are at risk for getting type 2 diabetes. It means that your blood sugar is higher than it should be. The food you eat turns into sugar, which your body uses for energy. Normally, an organ called the pancreas makes insulin, which allows the sugar in your blood to get into your body's cells. But when your body can't use insulin the right way, the sugar doesn't move into cells. It stays in your blood instead. This is called insulin resistance. The buildup of sugar in the blood causes prediabetes. The good news is that lifestyle changes may help you get your blood sugar back to normal and help you avoid or delay diabetes. Follow-up care is a key part of your treatment and safety. Be sure to make and go to all appointments, and call your doctor if you are having problems. It's also a good idea to know your test results and keep a list of the medicines you take. How can you care for yourself at home? · Watch your weight. A healthy weight helps your body use insulin properly. · Limit the amount of calories, sweets, and unhealthy fat you eat. Ask your doctor if you should see a dietitian. A registered dietitian can help you create meal plans that fit your lifestyle. · Get at least 30 minutes of exercise on most days of the week. Exercise helps control your blood sugar. It also helps you maintain a healthy weight. Walking is a good choice. You also may want to do other activities, such as running, swimming, cycling, or playing tennis or team sports. · Do not smoke. Smoking can make prediabetes worse. If you need help quitting, talk to your doctor about stop-smoking programs and medicines. These can increase your chances of quitting for good. · If your doctor prescribed medicines, take them exactly as prescribed. Call your doctor if you think you are having a problem with your medicine.  You will get more details on the specific medicines your doctor prescribes. When should you call for help? Watch closely for changes in your health, and be sure to contact your doctor if:  · You have any symptoms of diabetes. These may include:  ¨ Being thirsty more often. ¨ Urinating more. ¨ Being hungrier. ¨ Losing weight. ¨ Being very tired. ¨ Having blurry vision. · You have a wound that will not heal.  · You have an infection that will not go away. · You have problems with your blood pressure. · You want more information about diabetes and how you can keep from getting it. Where can you learn more? Go to http://amaris-jay.info/. Enter I222 in the search box to learn more about \"Prediabetes: Care Instructions. \"  Current as of: March 13, 2017  Content Version: 11.3  © 7627-6821 [employer]at.io. Care instructions adapted under license by [employer] (which disclaims liability or warranty for this information). If you have questions about a medical condition or this instruction, always ask your healthcare professional. [place] [address] any warranty or liability for your use of this information. High Cholesterol: Care Instructions  Your Care Instructions  Cholesterol is a type of fat in your blood. It is needed for many body functions, such as making new cells. Cholesterol is made by your body. It also comes from food you eat. High cholesterol means that you have too much of the fat in your blood. This raises your risk of a heart attack and stroke. LDL and HDL are part of your total cholesterol. LDL is the \"bad\" cholesterol. High LDL can raise your risk for heart disease, heart attack, and stroke. HDL is the \"good\" cholesterol. It helps clear bad cholesterol from the body. High HDL is linked with a lower risk of heart disease, heart attack, and stroke. Your cholesterol levels help your doctor find out your risk for having a heart attack or stroke.  You and your doctor can talk about whether you need to lower your risk and what treatment is best for you. A heart-healthy lifestyle along with medicines can help lower your cholesterol and your risk. The way you choose to lower your risk will depend on how high your risk is for heart attack and stroke. It will also depend on how you feel about taking medicines. Follow-up care is a key part of your treatment and safety. Be sure to make and go to all appointments, and call your doctor if you are having problems. It's also a good idea to know your test results and keep a list of the medicines you take. How can you care for yourself at home? · Eat a variety of foods every day. Good choices include fruits, vegetables, whole grains (like oatmeal), dried beans and peas, nuts and seeds, soy products (like tofu), and fat-free or low-fat dairy products. · Replace butter, margarine, and hydrogenated or partially hydrogenated oils with olive and canola oils. (Canola oil margarine without trans fat is fine.)  · Replace red meat with fish, poultry, and soy protein (like tofu). · Limit processed and packaged foods like chips, crackers, and cookies. · Bake, broil, or steam foods. Don't hill them. · Be physically active. Get at least 30 minutes of exercise on most days of the week. Walking is a good choice. You also may want to do other activities, such as running, swimming, cycling, or playing tennis or team sports. · Stay at a healthy weight or lose weight by making the changes in eating and physical activity listed above. Losing just a small amount of weight, even 5 to 10 pounds, can reduce your risk for having a heart attack or stroke. · Do not smoke. When should you call for help? Watch closely for changes in your health, and be sure to contact your doctor if:  · You need help making lifestyle changes. · You have questions about your medicine. Where can you learn more? Go to http://amaris-jay.info/.   Enter R084 in the search box to learn more about \"High Cholesterol: Care Instructions. \"  Current as of: April 3, 2017  Content Version: 11.3  © 2040-9373 [employer]. Care instructions adapted under license by [employer] (which disclaims liability or warranty for this information). If you have questions about a medical condition or this instruction, always ask your healthcare professional. [place] [address] any warranty or liability for your use of this information.

## 2017-07-27 LAB
C TRACH RRNA SPEC QL NAA+PROBE: NEGATIVE
CHOLEST SERPL-MCNC: 207 MG/DL (ref 100–199)
EST. AVERAGE GLUCOSE BLD GHB EST-MCNC: 117 MG/DL
HBA1C MFR BLD: 5.7 % (ref 4.8–5.6)
HDLC SERPL-MCNC: 47 MG/DL
HIV 1+2 AB+HIV1 P24 AG SERPL QL IA: NON REACTIVE
INTERPRETATION, 910389: NORMAL
LDLC SERPL CALC-MCNC: 142 MG/DL (ref 0–99)
N GONORRHOEA RRNA SPEC QL NAA+PROBE: NEGATIVE
T VAGINALIS RRNA SPEC QL NAA+PROBE: NEGATIVE
TRIGL SERPL-MCNC: 92 MG/DL (ref 0–149)
VLDLC SERPL CALC-MCNC: 18 MG/DL (ref 5–40)

## 2017-07-28 NOTE — PROGRESS NOTES
Your labs showed slightly elevated cholesterol, but improved from 3 months ago. Your total cholesterol was 207 and normal is less than 200. Continue staying on a low-fat diet. Your diabetes screening test showed that your prediabetes has improved a lot. Continue working on diet, exercise, and weight loss. Your HIV test was negative, meaning no HIV.  Your STD tests all returned normal.

## 2017-07-28 NOTE — PROGRESS NOTES
Spoke with patient and after verifying name and date of birth of patient gave him test results per Dr. Ishmael Mart. Patient stated understanding.

## 2017-10-23 ENCOUNTER — OFFICE VISIT (OUTPATIENT)
Dept: INTERNAL MEDICINE CLINIC | Age: 47
End: 2017-10-23

## 2017-10-23 VITALS
TEMPERATURE: 98.3 F | HEIGHT: 62 IN | DIASTOLIC BLOOD PRESSURE: 78 MMHG | SYSTOLIC BLOOD PRESSURE: 138 MMHG | OXYGEN SATURATION: 95 % | WEIGHT: 175 LBS | HEART RATE: 74 BPM | RESPIRATION RATE: 16 BRPM | BODY MASS INDEX: 32.2 KG/M2

## 2017-10-23 DIAGNOSIS — R73.03 PREDIABETES: ICD-10-CM

## 2017-10-23 DIAGNOSIS — E78.2 MIXED HYPERLIPIDEMIA: ICD-10-CM

## 2017-10-23 DIAGNOSIS — E29.1 HYPOGONADISM IN MALE: ICD-10-CM

## 2017-10-23 DIAGNOSIS — Z12.5 SCREENING FOR PROSTATE CANCER: ICD-10-CM

## 2017-10-23 DIAGNOSIS — R03.0 BLOOD PRESSURE ELEVATED WITHOUT HISTORY OF HTN: Primary | ICD-10-CM

## 2017-10-23 DIAGNOSIS — E66.9 OBESITY (BMI 30-39.9): ICD-10-CM

## 2017-10-23 DIAGNOSIS — D17.21 LIPOMA OF RIGHT UPPER EXTREMITY: ICD-10-CM

## 2017-10-23 RX ORDER — TESTOSTERONE CYPIONATE 200 MG/ML
INJECTION INTRAMUSCULAR
Refills: 0 | COMMUNITY
Start: 2017-10-11 | End: 2019-01-08

## 2017-10-23 NOTE — PATIENT INSTRUCTIONS
Elevated Blood Pressure: Care Instructions  Your Care Instructions    Blood pressure is a measure of how hard the blood pushes against the walls of your arteries. It's normal for blood pressure to go up and down throughout the day. But if it stays up over time, you have high blood pressure. Two numbers tell you your blood pressure. The first number is the systolic pressure. It shows how hard the blood pushes when your heart is pumping. The second number is the diastolic pressure. It shows how hard the blood pushes between heartbeats, when your heart is relaxed and filling with blood. An ideal blood pressure in adults is less than 120/80 (say \"120 over 80\"). High blood pressure is 140/90 or higher. You have high blood pressure if your top number is 140 or higher or your bottom number is 90 or higher, or both. The main test for high blood pressure is simple, fast, and painless. To diagnose high blood pressure, your doctor will test your blood pressure at different times. After testing your blood pressure, your doctor may ask you to test it again when you are home. If you are diagnosed with high blood pressure, you can work with your doctor to make a long-term plan to manage it. Follow-up care is a key part of your treatment and safety. Be sure to make and go to all appointments, and call your doctor if you are having problems. It's also a good idea to know your test results and keep a list of the medicines you take. How can you care for yourself at home? · Do not smoke. Smoking increases your risk for heart attack and stroke. If you need help quitting, talk to your doctor about stop-smoking programs and medicines. These can increase your chances of quitting for good. · Stay at a healthy weight. · Try to limit how much sodium you eat to less than 2,300 milligrams (mg) a day. Your doctor may ask you to try to eat less than 1,500 mg a day. · Be physically active.  Get at least 30 minutes of exercise on most days of the week. Walking is a good choice. You also may want to do other activities, such as running, swimming, cycling, or playing tennis or team sports. · Avoid or limit alcohol. Talk to your doctor about whether you can drink any alcohol. · Eat plenty of fruits, vegetables, and low-fat dairy products. Eat less saturated and total fats. · Learn how to check your blood pressure at home. When should you call for help? Call your doctor now or seek immediate medical care if:  · Your blood pressure is much higher than normal (such as 180/110 or higher). · You think high blood pressure is causing symptoms such as:  ¨ Severe headache. ¨ Blurry vision. Watch closely for changes in your health, and be sure to contact your doctor if:  · You do not get better as expected. Where can you learn more? Go to http://amaris-jay.info/. Enter F200 in the search box to learn more about \"Elevated Blood Pressure: Care Instructions. \"  Current as of: April 3, 2017  Content Version: 11.3  © 7886-9487 [employer]. Care instructions adapted under license by [employer] (which disclaims liability or warranty for this information). If you have questions about a medical condition or this instruction, always ask your healthcare professional. [address] any warranty or liability for your use of this information. Learning About High Cholesterol  What is high cholesterol? Cholesterol is a type of fat in your blood. It is needed for many body functions, such as making new cells. Cholesterol is made by your body. It also comes from food you eat. If you have too much cholesterol, it starts to build up in your arteries. This is called hardening of the arteries, or atherosclerosis. High cholesterol raises your risk of a heart attack and stroke. There are different types of cholesterol. LDL is the \"bad\" cholesterol.  High LDL can raise your risk for heart disease, heart attack, and stroke. HDL is the \"good\" cholesterol. High HDL is linked with a lower risk for heart disease, heart attack, and stroke. Your cholesterol levels help your doctor find out your risk for having a heart attack or stroke. How can you prevent high cholesterol? A heart-healthy lifestyle can help you prevent high cholesterol. This lifestyle helps lower your risk for a heart attack and stroke. · Eat heart-healthy foods. ¨ Eat fruits, vegetables, whole grains (like oatmeal), dried beans and peas, nuts and seeds, soy products (like tofu), and fat-free or low-fat dairy products. ¨ Replace butter, margarine, and hydrogenated or partially hydrogenated oils with olive and canola oils. (Canola oil margarine without trans fat is fine.)  ¨ Replace red meat with fish, poultry, and soy protein (like tofu). ¨ Limit processed and packaged foods like chips, crackers, and cookies. · Be active. Exercise can improve your cholesterol level. Get at least 30 minutes of exercise on most days of the week. Walking is a good choice. You also may want to do other activities, such as running, swimming, cycling, or playing tennis or team sports. · Stay at a healthy weight. Lose weight if you need to. · Don't smoke. If you need help quitting, talk to your doctor about stop-smoking programs and medicines. These can increase your chances of quitting for good. How is high cholesterol treated? The goal of treatment is to reduce your chances of having a heart attack or stroke. The goal is not to lower your cholesterol numbers only. · You may make lifestyle changes, such as eating healthy foods, not smoking, losing weight, and being more active. · You may have to take medicine. Follow-up care is a key part of your treatment and safety. Be sure to make and go to all appointments, and call your doctor if you are having problems. It's also a good idea to know your test results and keep a list of the medicines you take.   Where can you learn more? Go to http://amaris-jay.info/. Enter Q480 in the search box to learn more about \"Learning About High Cholesterol. \"  Current as of: April 3, 2017  Content Version: 11.3  © 2799-1067 [employer]. Care instructions adapted under license by [employer] (which disclaims liability or warranty for this information). If you have questions about a medical condition or this instruction, always ask your healthcare professional. [place] [address] any warranty or liability for your use of this information.

## 2017-10-23 NOTE — MR AVS SNAPSHOT
Visit Information Date & Time Provider Department Dept. Phone Encounter #  
 10/23/2017 11:30 AM [place], [address] [id number] Follow-up Instructions Return in about 6 months (around 4/23/2018), or if symptoms worsen or fail to improve, for BP, hyperlipidemia, prediabetes; schedule fasting labs 1 week prior to appt. Upcoming Health Maintenance Date Due DTaP/Tdap/Td series (2 - Td) 10/17/2021 Allergies as of 10/23/2017  Review Complete On: 10/23/2017 By: Jen Chi LPN No Known Allergies Current Immunizations  Reviewed on 3/16/2015 Name Date Influenza Vaccine Split 10/17/2011 TDAP Vaccine 10/17/2011 Not reviewed this visit You Were Diagnosed With   
  
 Codes Comments Blood pressure elevated without history of HTN    -  Primary ICD-10-CM: R03.0 ICD-9-CM: 796.2 Mixed hyperlipidemia     ICD-10-CM: E78.2 ICD-9-CM: 272.2 Prediabetes     ICD-10-CM: R73.03 
ICD-9-CM: 790.29 Hypogonadism in male     ICD-10-CM: E29.1 ICD-9-CM: 257.2 Obesity (BMI 30-39. 9)     ICD-10-CM: E66.9 ICD-9-CM: 278.00 Screening for prostate cancer     ICD-10-CM: Z12.5 ICD-9-CM: V76.44 Vitals BP Pulse Temp Resp Height(growth percentile) Weight(growth percentile) 138/78 (BP 1 Location: Left arm, BP Patient Position: Sitting) 74 98.3 °F (36.8 °C) (Oral) 16 5' 2\" (1.575 m) 175 lb (79.4 kg) SpO2 BMI Smoking Status 95% 32.01 kg/m2 Never Smoker Vitals History BMI and BSA Data Body Mass Index Body Surface Area 32.01 kg/m 2 1.86 m 2 Preferred Pharmacy Pharmacy Name Phone [id number] [place] [place] [address] [phone] Your Updated Medication List  
  
   
This list is accurate as of: 10/23/17 11:39 AM.  Always use your most recent med list.  
  
  
  
  
 aspirin 81 mg chewable tablet Take 81 mg by mouth daily. testosterone cypionate 200 mg/mL injection Commonly known as:  DEPOTESTOTERONE CYPIONATE  
getting every 2 weeks Follow-up Instructions Return in about 6 months (around 4/23/2018), or if symptoms worsen or fail to improve, for BP, hyperlipidemia, prediabetes; schedule fasting labs 1 week prior to appt. To-Do List   
 04/16/2018 Lab:  CBC WITH AUTOMATED DIFF   
  
 04/16/2018 Lab:  HEMOGLOBIN A1C WITH EAG   
  
 04/16/2018 Lab:  LIPID PANEL   
  
 04/16/2018 Lab:  METABOLIC PANEL, COMPREHENSIVE   
  
 04/16/2018 Lab:  PSA, DIAGNOSTIC (PROSTATE SPECIFIC AG)   
  
 04/16/2018 Lab:  TSH AND FREE T4 Patient Instructions Elevated Blood Pressure: Care Instructions Your Care Instructions Blood pressure is a measure of how hard the blood pushes against the walls of your arteries. It's normal for blood pressure to go up and down throughout the day. But if it stays up over time, you have high blood pressure. Two numbers tell you your blood pressure. The first number is the systolic pressure. It shows how hard the blood pushes when your heart is pumping. The second number is the diastolic pressure. It shows how hard the blood pushes between heartbeats, when your heart is relaxed and filling with blood. An ideal blood pressure in adults is less than 120/80 (say \"120 over 80\"). High blood pressure is 140/90 or higher. You have high blood pressure if your top number is 140 or higher or your bottom number is 90 or higher, or both. The main test for high blood pressure is simple, fast, and painless. To diagnose high blood pressure, your doctor will test your blood pressure at different times. After testing your blood pressure, your doctor may ask you to test it again when you are home. If you are diagnosed with high blood pressure, you can work with your doctor to make a long-term plan to manage it. Follow-up care is a key part of your treatment and safety. Be sure to make and go to all appointments, and call your doctor if you are having problems. It's also a good idea to know your test results and keep a list of the medicines you take. How can you care for yourself at home? · Do not smoke. Smoking increases your risk for heart attack and stroke. If you need help quitting, talk to your doctor about stop-smoking programs and medicines. These can increase your chances of quitting for good. · Stay at a healthy weight. · Try to limit how much sodium you eat to less than 2,300 milligrams (mg) a day. Your doctor may ask you to try to eat less than 1,500 mg a day. · Be physically active. Get at least 30 minutes of exercise on most days of the week. Walking is a good choice. You also may want to do other activities, such as running, swimming, cycling, or playing tennis or team sports. · Avoid or limit alcohol. Talk to your doctor about whether you can drink any alcohol. · Eat plenty of fruits, vegetables, and low-fat dairy products. Eat less saturated and total fats. · Learn how to check your blood pressure at home. When should you call for help? Call your doctor now or seek immediate medical care if: 
· Your blood pressure is much higher than normal (such as 180/110 or higher). · You think high blood pressure is causing symptoms such as: ¨ Severe headache. ¨ Blurry vision. Watch closely for changes in your health, and be sure to contact your doctor if: 
· You do not get better as expected. Where can you learn more? Go to http://amaris-jay.info/. Enter W879 in the search box to learn more about \"Elevated Blood Pressure: Care Instructions. \" Current as of: April 3, 2017 Content Version: 11.3 © 8415-1909 "[employer], Inc", Incorporated.  Care instructions adapted under license by Ek[employer] (which disclaims liability or warranty for this information). If you have questions about a medical condition or this instruction, always ask your healthcare professional. [address] any warranty or liability for your use of this information. Learning About High Cholesterol What is high cholesterol? Cholesterol is a type of fat in your blood. It is needed for many body functions, such as making new cells. Cholesterol is made by your body. It also comes from food you eat. If you have too much cholesterol, it starts to build up in your arteries. This is called hardening of the arteries, or atherosclerosis. High cholesterol raises your risk of a heart attack and stroke. There are different types of cholesterol. LDL is the \"bad\" cholesterol. High LDL can raise your risk for heart disease, heart attack, and stroke. HDL is the \"good\" cholesterol. High HDL is linked with a lower risk for heart disease, heart attack, and stroke. Your cholesterol levels help your doctor find out your risk for having a heart attack or stroke. How can you prevent high cholesterol? A heart-healthy lifestyle can help you prevent high cholesterol. This lifestyle helps lower your risk for a heart attack and stroke. · Eat heart-healthy foods. ¨ Eat fruits, vegetables, whole grains (like oatmeal), dried beans and peas, nuts and seeds, soy products (like tofu), and fat-free or low-fat dairy products. ¨ Replace butter, margarine, and hydrogenated or partially hydrogenated oils with olive and canola oils. (Canola oil margarine without trans fat is fine.) ¨ Replace red meat with fish, poultry, and soy protein (like tofu). ¨ Limit processed and packaged foods like chips, crackers, and cookies. · Be active. Exercise can improve your cholesterol level. Get at least 30 minutes of exercise on most days of the week. Walking is a good choice. You also may want to do other activities, such as running, swimming, cycling, or playing tennis or team sports. · Stay at a healthy weight. Lose weight if you need to. · Don't smoke. If you need help quitting, talk to your doctor about stop-smoking programs and medicines. These can increase your chances of quitting for good. How is high cholesterol treated? The goal of treatment is to reduce your chances of having a heart attack or stroke. The goal is not to lower your cholesterol numbers only. · You may make lifestyle changes, such as eating healthy foods, not smoking, losing weight, and being more active. · You may have to take medicine. Follow-up care is a key part of your treatment and safety. Be sure to make and go to all appointments, and call your doctor if you are having problems. It's also a good idea to know your test results and keep a list of the medicines you take. Where can you learn more? Go to http://amaris-jay.info/. Enter S428 in the search box to learn more about \"Learning About High Cholesterol. \" Current as of: April 3, 2017 Content Version: 11.3 © 5597-6860 [employer]. Care instructions adapted under license by [employer] (which disclaims liability or warranty for this information). If you have questions about a medical condition or this instruction, always ask your healthcare professional. [address] any warranty or liability for your use of this information. Introducing [facility] & HEALTH SERVICES! Carolyn Lynne introduces [employer] patient portal. Now you can access parts of your medical record, email your doctor's office, and request medication refills online. 1. In your internet browser, go to [email or web address] 2. Click on the First Time User? Click Here link in the Sign In box. You will see the New Member Sign Up page. 3. Enter your [employer] Access Code exactly as it appears below. You will not need to use this code after youve completed the sign-up process.  If you do not sign up before the expiration date, you must request a new code. · [email or web address] Access Code: [email or web address] Expires: 1/21/2018 11:39 AM 
 
4. Enter the last four digits of your Social Security Number (xxxx) and Date of Birth (mm/dd/yyyy) as indicated and click Submit. You will be taken to the next sign-up page. 5. Create a [email or web address] ID. This will be your [email or web address] login ID and cannot be changed, so think of one that is secure and easy to remember. 6. Create a [email or web address].com password. You can change your password at any time. 7. Enter your Password Reset Question and Answer. This can be used at a later time if you forget your password. 8. Enter your e-mail address. You will receive e-mail notification when new information is available in [address] 9. Click Sign Up. You can now view and download portions of your medical record. 10. Click the Download Summary menu link to download a portable copy of your medical information. If you have questions, please visit the Frequently Asked Questions section of the [email or web address] website. Remember, [email or web address].com is NOT to be used for urgent needs. For medical emergencies, dial 911. Now available from your iPhone and Android! Please provide this summary of care documentation to your next provider. Your primary care clinician is listed as Saralyn Hodgkins. If you have any questions after today's visit, please call [phone].

## 2017-10-23 NOTE — PROGRESS NOTES
Reviewed record  In preparation for visit and have obtained necessary documentation. 1. Have you been to the ER, urgent care clinic since your last visit? Hospitalized since your last visit?no  2. Have you seen or consulted any other health care providers outside of the [facility] since your last visit? Include any pap smears or colon screening. Saw Dr Dexter Denny  Advanced directives: Patient has been given information on advanced directives at a previous visit. Patients vital signs discussed with physician.

## 2018-04-27 ENCOUNTER — TELEPHONE (OUTPATIENT)
Dept: INTERNAL MEDICINE CLINIC | Facility: CLINIC | Age: 48
End: 2018-04-27

## 2018-05-01 ENCOUNTER — OFFICE VISIT (OUTPATIENT)
Dept: INTERNAL MEDICINE CLINIC | Facility: CLINIC | Age: 48
End: 2018-05-01

## 2018-05-01 VITALS
RESPIRATION RATE: 16 BRPM | SYSTOLIC BLOOD PRESSURE: 143 MMHG | HEIGHT: 62 IN | DIASTOLIC BLOOD PRESSURE: 88 MMHG | BODY MASS INDEX: 31.47 KG/M2 | OXYGEN SATURATION: 98 % | WEIGHT: 171 LBS | HEART RATE: 79 BPM | TEMPERATURE: 98.2 F

## 2018-05-01 DIAGNOSIS — E29.1 HYPOGONADISM IN MALE: ICD-10-CM

## 2018-05-01 DIAGNOSIS — R73.03 PREDIABETES: ICD-10-CM

## 2018-05-01 DIAGNOSIS — E78.2 MIXED HYPERLIPIDEMIA: ICD-10-CM

## 2018-05-01 DIAGNOSIS — E66.9 OBESITY (BMI 30-39.9): ICD-10-CM

## 2018-05-01 DIAGNOSIS — Z12.5 SCREENING FOR PROSTATE CANCER: ICD-10-CM

## 2018-05-01 DIAGNOSIS — M77.8 TENDONITIS OF RIGHT HAND: ICD-10-CM

## 2018-05-01 DIAGNOSIS — I10 ESSENTIAL HYPERTENSION: Primary | ICD-10-CM

## 2018-05-01 RX ORDER — AMLODIPINE BESYLATE 5 MG/1
5 TABLET ORAL DAILY
Qty: 30 TAB | Refills: 2 | Status: SHIPPED | OUTPATIENT
Start: 2018-05-01 | End: 2018-07-02 | Stop reason: ALTCHOICE

## 2018-05-01 RX ORDER — NAPROXEN 500 MG/1
500 TABLET ORAL 2 TIMES DAILY WITH MEALS
Qty: 14 TAB | Refills: 0 | Status: SHIPPED | OUTPATIENT
Start: 2018-05-01 | End: 2018-05-08

## 2018-05-01 NOTE — MR AVS SNAPSHOT
[address] [address] [phone] Patient: Linsey Murray MRN: [id number] :1970 Visit Information Date & Time Provider Department Dept. Phone Encounter #  
 2018  8:50 AM MD Mitzy Kent [facility] Internal Medicine [address] [id number] Follow-up Instructions Return in about 2 months (around 2018), or if symptoms worsen or fail to improve, for HTN. Upcoming Health Maintenance Date Due Influenza Age 5 to Adult 2018 DTaP/Tdap/Td series (2 - Td) 10/17/2021 Allergies as of 2018  Review Complete On: 2018 By: Sonia Martinez MD  
 No Known Allergies Current Immunizations  Reviewed on 3/16/2015 Name Date Influenza Vaccine Split 10/17/2011 TDAP Vaccine 10/17/2011 Not reviewed this visit You Were Diagnosed With   
  
 Codes Comments Essential hypertension    -  Primary ICD-10-CM: I10 
ICD-9-CM: 401.9 Tendonitis of right hand     ICD-10-CM: M77.8 ICD-9-CM: 727.05 Mixed hyperlipidemia     ICD-10-CM: E78.2 ICD-9-CM: 272.2 Hypogonadism in male     ICD-10-CM: E29.1 ICD-9-CM: 257.2 Prediabetes     ICD-10-CM: R73.03 
ICD-9-CM: 790.29 Obesity (BMI 30-39. 9)     ICD-10-CM: E66.9 ICD-9-CM: 278.00 Vitals BP Pulse Temp Resp Height(growth percentile) Weight(growth percentile) 143/88 79 98.2 °F (36.8 °C) (Oral) 16 5' 2\" (1.575 m) 171 lb (77.6 kg) SpO2 BMI Smoking Status 98% 31.28 kg/m2 Never Smoker Vitals History BMI and BSA Data Body Mass Index Body Surface Area  
 31.28 kg/m 2 1.84 m 2 Preferred Pharmacy [facility] Name Phone [id number] [address] [address] [phone] Your Updated Medication List  
  
   
This list is accurate as of 18  9:24 AM.  Always use your most recent med list. amLODIPine 5 mg tablet Commonly known as:  Abe Sanchez Take 1 Tab by mouth daily. Indications: hypertension  
  
 aspirin 81 mg chewable tablet Take 81 mg by mouth daily. naproxen 500 mg tablet Commonly known as:  NAPROSYN Take 1 Tab by mouth two (2) times daily (with meals) for 7 days. For right hand pain. testosterone cypionate 200 mg/mL injection Commonly known as:  DEPOTESTOTERONE CYPIONATE  
getting every 2 weeks Prescriptions Sent to Pharmacy Refills  
 amLODIPine (NORVASC) 5 mg tablet 2 Sig: Take 1 Tab by mouth daily. Indications: hypertension Class: Normal  
 Pharmacy: [facility][id number] [address], [address]  #: [phone] Route: Oral  
 naproxen (NAPROSYN) 500 mg tablet 0 Sig: Take 1 Tab by mouth two (2) times daily (with meals) for 7 days. For right hand pain. Class: Normal  
 Pharmacy: [facility][id number] [place] [address], [address]  #: [phone] Route: Oral  
  
Follow-up Instructions Return in about 2 months (around 7/1/2018), or if symptoms worsen or fail to improve, for HTN. Patient Instructions Learning About High Blood Pressure What is high blood pressure? Blood pressure is a measure of how hard the blood pushes against the walls of your arteries. It's normal for blood pressure to go up and down throughout the day, but if it stays up, you have high blood pressure. Another name for high blood pressure is hypertension. Two numbers tell you your blood pressure. The first number is the systolic pressure. It shows how hard the blood pushes when your heart is pumping. The second number is the diastolic pressure. It shows how hard the blood pushes between heartbeats, when your heart is relaxed and filling with blood. A blood pressure of less than 120/80 (say \"120 over 80\") is ideal for an adult. High blood pressure is 140/90 or higher.  You have high blood pressure if your top number is 140 or higher or your bottom number is 90 or higher, or both. Many people fall into the category in between, called prehypertension. People with prehypertension need to make lifestyle changes to bring their blood pressure down and help prevent or delay high blood pressure. What happens when you have high blood pressure? · Blood flows through your arteries with too much force. Over time, this damages the walls of your arteries. But you can't feel it. High blood pressure usually doesn't cause symptoms. · Fat and calcium start to build up in your arteries. This buildup is called plaque. Plaque makes your arteries narrower and stiffer. Blood can't flow through them as easily. · This lack of good blood flow starts to damage some of the organs in your body. This can lead to problems such as coronary artery disease and heart attack, heart failure, stroke, kidney failure, and eye damage. How can you prevent high blood pressure? · Stay at a healthy weight. · Try to limit how much sodium you eat to less than 2,300 milligrams (mg) a day. If you limit your sodium to 1,500 mg a day, you can lower your blood pressure even more. ¨ Buy foods that are labeled \"unsalted,\" \"sodium-free,\" or \"low-sodium. \" Foods labeled \"reduced-sodium\" and \"light sodium\" may still have too much sodium. ¨ Flavor your food with garlic, lemon juice, onion, vinegar, herbs, and spices instead of salt. Do not use soy sauce, steak sauce, onion salt, garlic salt, mustard, or ketchup on your food. ¨ Use less salt (or none) when recipes call for it. You can often use half the salt a recipe calls for without losing flavor. · Be physically active. Get at least 30 minutes of exercise on most days of the week. Walking is a good choice. You also may want to do other activities, such as running, swimming, cycling, or playing tennis or team sports. · Limit alcohol to 2 drinks a day for men and 1 drink a day for women. · Eat plenty of fruits, vegetables, and low-fat dairy products. Eat less saturated and total fats. How is high blood pressure treated? · Your doctor will suggest making lifestyle changes. For example, your doctor may ask you to eat healthy foods, quit smoking, lose extra weight, and be more active. · If lifestyle changes don't help enough or your blood pressure is very high, you will have to take medicine every day. Follow-up care is a key part of your treatment and safety. Be sure to make and go to all appointments, and call your doctor if you are having problems. It's also a good idea to know your test results and keep a list of the medicines you take. Where can you learn more? Go to http://amaris-jay.info/. Enter P501 in the search box to learn more about \"Learning About High Blood Pressure. \" Current as of: September 21, 2016 Content Version: 11.4 © 9516-6153 [employer]. Care instructions adapted under license by [employer] (which disclaims liability or warranty for this information). If you have questions about a medical condition or this instruction, always ask your healthcare professional. [address] [address] any warranty or liability for your use of this information. Introducing [facility] & [facility] SERVICES! Roshan Albert introduces [employer] patient portal. Now you can access parts of your medical record, email your doctor's office, and request medication refills online. 1. In your internet browser, go to [email or web address] 2. Click on the First Time User? Click Here link in the Sign In box. You will see the New Member Sign Up page. 3. Enter your [employer] Access Code exactly as it appears below. You will not need to use this code after youve completed the sign-up process. If you do not sign up before the expiration date, you must request a new code. · [employer] Access Code: [email or web address] Expires: 7/30/2018  9:13 AM 
 
4. Enter the last four digits of your Social Security Number (xxxx) and Date of Birth (mm/dd/yyyy) as indicated and click Submit. You will be taken to the next sign-up page. 5. Create a StatsMix ID. This will be your [employer] login ID and cannot be changed, so think of one that is secure and easy to remember. 6. Create a StatsMix password. You can change your password at any time. 7. Enter your Password Reset Question and Answer. This can be used at a later time if you forget your password. 8. Enter your e-mail address. You will receive e-mail notification when new information is available in [address] 9. Click Sign Up. You can now view and download portions of your medical record. 10. Click the Download Summary menu link to download a portable copy of your medical information. If you have questions, please visit the Frequently Asked Questions section of the StatsMix website. Remember, [employer]Mix is NOT to be used for urgent needs. For medical emergencies, dial 911. Now available from your iPhone and Android! Please provide this summary of care documentation to your next provider. Your primary care clinician is listed as Keyanna Gibbons. If you have any questions after today's visit, please call [phone].

## 2018-05-01 NOTE — PROGRESS NOTES
Ishmael Pratt  Identified pt with two pt identifiers(name and ). Chief Complaint   Patient presents with    Cholesterol Problem     send for labs    Blood sugar problem    Hand Pain     right hand       1. Have you been to the ER, urgent care clinic since your last visit? Hospitalized since your last visit? NO    2. Have you seen or consulted any other health care providers outside of the [facility] since your last visit? Include any pap smears or colon screening. NO    Today's provider has been notified of reason for visit, vitals and flowsheets obtained on patients. Patient received paperwork for advance directive during previous visit but has not completed at this time     Reviewed record In preparation for visit, huddled with provider and have obtained necessary documentation      There are no preventive care reminders to display for this patient.     Wt Readings from Last 3 Encounters:   18 171 lb (77.6 kg)   10/23/17 175 lb (79.4 kg)   17 170 lb (77.1 kg)     Temp Readings from Last 3 Encounters:   18 98.2 °F (36.8 °C) (Oral)   10/23/17 98.3 °F (36.8 °C) (Oral)   17 98.5 °F (36.9 °C) (Oral)     BP Readings from Last 3 Encounters:   18 145/83   10/23/17 138/78   17 130/83     Pulse Readings from Last 3 Encounters:   18 79   10/23/17 74   17 76     Vitals:    18 0844   BP: 145/83   Pulse: 79   Resp: 16   Temp: 98.2 °F (36.8 °C)   TempSrc: Oral   SpO2: 98%   Weight: 171 lb (77.6 kg)   Height: 5' 2\" (1.575 m)   PainSc:   6         Learning Assessment:  :     Learning Assessment 3/16/2015   PRIMARY LEARNER Patient   HIGHEST LEVEL OF EDUCATION - PRIMARY LEARNER  GRADUATED HIGH SCHOOL OR GED   BARRIERS PRIMARY LEARNER NONE   CO-LEARNER CAREGIVER No   PRIMARY LANGUAGE ENGLISH   LEARNER PREFERENCE PRIMARY LISTENING   ANSWERED BY self   RELATIONSHIP SELF       Depression Screening:  :     PHQ over the last two weeks 2018   Little interest or pleasure in doing things Not at all   Feeling down, depressed or hopeless Not at all   Total Score PHQ 2 0       Fall Risk Assessment:  :     No flowsheet data found. Abuse Screening:  :     No flowsheet data found. ADL Screening:  :     ADL Assessment 5/1/2018   Feeding yourself No Help Needed   Getting from bed to chair No Help Needed   Getting dressed No Help Needed   Bathing or showering No Help Needed   Walk across the room (includes cane/walker) No Help Needed   Using the telphone No Help Needed   Taking your medications No Help Needed   Preparing meals No Help Needed   Managing money (expenses/bills) No Help Needed   Moderately strenuous housework (laundry) No Help Needed   Shopping for personal items (toiletries/medicines) No Help Needed   Shopping for groceries No Help Needed   Driving No Help Needed   Climbing a flight of stairs No Help Needed   Getting to places beyond walking distances No Help Needed       Medication reconciliation up to date and corrected with patient at this time.

## 2018-05-01 NOTE — PROGRESS NOTES
CC:   Chief Complaint   Patient presents with    Cholesterol Problem     send for labs    Blood sugar problem    Hand Pain     right hand       HISTORY OF PRESENT ILLNESS  Franko Ogden is a 53 y.o. male. Presents for 6 month follow up evaluation. He has HTN, hyperlipidemia, hypogonadism (absent left testicle, atrophic right testicle), and ED. He gives himself testosterone injection. He complains of 5 day history of pain at right palm after using hand a lot.     Cardiovascular Review  The patient has hypertension and hyperlipidemia. On no medications for either. States he has never been on blood pressure medication.  Was previously on atorvastatin; tolerated with no side effects. Diet and Lifestyle: trying to follow a low salt, low fat, low cholesterol diet.  Exercises regularly by riding bicycle and walking.      Soc Hx  . Has 2 daughters. Lives alone in a house. Works as a . Never smoker. Drinks beer occasionally. Denies recreational drug use. Drinks mostly water; infrequently drinks Diet sodas and energy drinks.     ROS  A complete review of systems was performed and is negative except for those mentioned in the HPI. Patient Active Problem List   Diagnosis Code    FH: CAD (coronary artery disease) Z82.49    Hyperlipidemia E78.5    Vitamin D deficiency E55.9    Prediabetes R73.03    Hypogonadism in male E29.1    Erectile dysfunction N52.9    Family history of prostate cancer Z80.42     Past Medical History:   Diagnosis Date    FH: CAD (coronary artery disease) 10/17/2011    Undescended right testicle 10/17/2011     No Known Allergies    Current Outpatient Prescriptions   Medication Sig Dispense Refill    testosterone cypionate (DEPOTESTOTERONE CYPIONATE) 200 mg/mL injection getting every 2 weeks  0    aspirin 81 mg chewable tablet Take 81 mg by mouth daily.            PHYSICAL EXAM  Visit Vitals    /88    Pulse 79    Temp 98.2 °F (36.8 °C) (Oral)    Resp 16    Ht 5' 2\" (1.575 m)    Wt 171 lb (77.6 kg)    SpO2 98%    BMI 31.28 kg/m2       General: Obese, no distress. HEENT:  Head normocephalic/atraumatic, no scleral icterus  Neck: Supple. No carotid bruits, JVD, lymphadenopathy, or thyromegaly. Lungs:  Clear to ausculation bilaterally. Good air movement. Heart:  Regular rate and rhythm, normal S1 and S2, no murmur, gallop, or rub  Extremities: No clubbing, cyanosis, or edema. 2+ DP's. Neurological: Alert and oriented. Psychiatric: Normal mood and affect. Behavior is normal.         ASSESSMENT AND PLAN    ICD-10-CM ICD-9-CM    1. Essential hypertension I10 401.9 amLODIPine (NORVASC) 5 mg tablet   2. Tendonitis of right hand M77.8 727.05 naproxen (NAPROSYN) 500 mg tablet   3. Mixed hyperlipidemia B29.3 489.9 METABOLIC PANEL, COMPREHENSIVE      CBC WITH AUTOMATED DIFF      TSH AND FREE T4      LIPID PANEL   4. Hypogonadism in male E29.1 257.2 URINALYSIS W/ RFLX MICROSCOPIC      TESTOSTERONE, TOTAL, ADULT MALE   5. Prediabetes R73.03 790.29 HEMOGLOBIN A1C WITH EAG   6. Obesity (BMI 30-39. 9) E66.9 278.00    7. Screening for prostate cancer Z12.5 V76.44 PSA, DIAGNOSTIC (PROSTATE SPECIFIC AG)       Diagnoses and all orders for this visit:    1. Essential hypertension, newly diagnosed  Had elevated BP at 3 visits over the past year. -     Start amLODIPine (NORVASC) 5 mg tablet; Take 1 Tab by mouth daily. Indications: hypertension    2. Tendonitis of right hand  -     Start naproxen (NAPROSYN) 500 mg tablet; Take 1 Tab by mouth two (2) times daily (with meals) for 7 days. For right hand pain. 3. Mixed hyperlipidemia  -     METABOLIC PANEL, COMPREHENSIVE  -     CBC WITH AUTOMATED DIFF  -     TSH AND FREE T4  -     LIPID PANEL    4. Hypogonadism in male  -     URINALYSIS W/ RFLX MICROSCOPIC  -     TESTOSTERONE, TOTAL, ADULT MALE    5. Prediabetes  -     HEMOGLOBIN A1C WITH EAG    6. Obesity (BMI 30-39. 9)  Discussed the patient's BMI with him.  The BMI follow up plan is as follows: dietary management education, guidance, and counseling; encourage exercise; monitor weight; prescribed dietary intake. Follow up BMI in 2 months. 7. Screening for prostate cancer  -     PROSTATE SPECIFIC AG      Follow-up Disposition:  Return in about 2 months (around 7/1/2018), or if symptoms worsen or fail to improve, for HTN. Provided patient and/or family with advanced directive information and answered pertinent questions. Encouraged patient to provide a copy of advanced directive to the office when available. I have discussed the diagnosis with the patient and the intended plan as seen in the above orders. Patient is in agreement. The patient has received an after-visit summary and questions were answered concerning future plans. I have discussed medication side effects and warnings with the patient as well.

## 2018-05-01 NOTE — PATIENT INSTRUCTIONS
Learning About High Blood Pressure  What is high blood pressure? Blood pressure is a measure of how hard the blood pushes against the walls of your arteries. It's normal for blood pressure to go up and down throughout the day, but if it stays up, you have high blood pressure. Another name for high blood pressure is hypertension. Two numbers tell you your blood pressure. The first number is the systolic pressure. It shows how hard the blood pushes when your heart is pumping. The second number is the diastolic pressure. It shows how hard the blood pushes between heartbeats, when your heart is relaxed and filling with blood. A blood pressure of less than 120/80 (say \"120 over 80\") is ideal for an adult. High blood pressure is 140/90 or higher. You have high blood pressure if your top number is 140 or higher or your bottom number is 90 or higher, or both. Many people fall into the category in between, called prehypertension. People with prehypertension need to make lifestyle changes to bring their blood pressure down and help prevent or delay high blood pressure. What happens when you have high blood pressure? · Blood flows through your arteries with too much force. Over time, this damages the walls of your arteries. But you can't feel it. High blood pressure usually doesn't cause symptoms. · Fat and calcium start to build up in your arteries. This buildup is called plaque. Plaque makes your arteries narrower and stiffer. Blood can't flow through them as easily. · This lack of good blood flow starts to damage some of the organs in your body. This can lead to problems such as coronary artery disease and heart attack, heart failure, stroke, kidney failure, and eye damage. How can you prevent high blood pressure? · Stay at a healthy weight. · Try to limit how much sodium you eat to less than 2,300 milligrams (mg) a day.  If you limit your sodium to 1,500 mg a day, you can lower your blood pressure even more.  ¨ Buy foods that are labeled \"unsalted,\" \"sodium-free,\" or \"low-sodium. \" Foods labeled \"reduced-sodium\" and \"light sodium\" may still have too much sodium. ¨ Flavor your food with garlic, lemon juice, onion, vinegar, herbs, and spices instead of salt. Do not use soy sauce, steak sauce, onion salt, garlic salt, mustard, or ketchup on your food. ¨ Use less salt (or none) when recipes call for it. You can often use half the salt a recipe calls for without losing flavor. · Be physically active. Get at least 30 minutes of exercise on most days of the week. Walking is a good choice. You also may want to do other activities, such as running, swimming, cycling, or playing tennis or team sports. · Limit alcohol to 2 drinks a day for men and 1 drink a day for women. · Eat plenty of fruits, vegetables, and low-fat dairy products. Eat less saturated and total fats. How is high blood pressure treated? · Your doctor will suggest making lifestyle changes. For example, your doctor may ask you to eat healthy foods, quit smoking, lose extra weight, and be more active. · If lifestyle changes don't help enough or your blood pressure is very high, you will have to take medicine every day. Follow-up care is a key part of your treatment and safety. Be sure to make and go to all appointments, and call your doctor if you are having problems. It's also a good idea to know your test results and keep a list of the medicines you take. Where can you learn more? Go to http://amaris-jay.info/. Enter P501 in the search box to learn more about \"Learning About High Blood Pressure. \"  Current as of: September 21, 2016  Content Version: 11.4  © 0439-0192 "[employer]. Care instructions adapted under license by [employer] (which disclaims liability or warranty for this information).  If you have questions about a medical condition or this instruction, always ask your healthcare professional. [employer] disclaims any warranty or liability for your use of this information.

## 2018-05-02 LAB
ALBUMIN SERPL-MCNC: 4.3 G/DL (ref 3.5–5.5)
ALBUMIN/GLOB SERPL: 1.7 {RATIO} (ref 1.2–2.2)
ALP SERPL-CCNC: 60 IU/L (ref 39–117)
ALT SERPL-CCNC: 19 IU/L (ref 0–44)
APPEARANCE UR: CLEAR
AST SERPL-CCNC: 20 IU/L (ref 0–40)
BASOPHILS # BLD AUTO: 0 X10E3/UL (ref 0–0.2)
BASOPHILS NFR BLD AUTO: 0 %
BILIRUB SERPL-MCNC: 0.3 MG/DL (ref 0–1.2)
BILIRUB UR QL STRIP: NEGATIVE
BUN SERPL-MCNC: 11 MG/DL (ref 6–24)
BUN/CREAT SERPL: 13 (ref 9–20)
CALCIUM SERPL-MCNC: 9.7 MG/DL (ref 8.7–10.2)
CHLORIDE SERPL-SCNC: 101 MMOL/L (ref 96–106)
CHOLEST SERPL-MCNC: 212 MG/DL (ref 100–199)
CO2 SERPL-SCNC: 25 MMOL/L (ref 18–29)
COLOR UR: YELLOW
CREAT SERPL-MCNC: 0.88 MG/DL (ref 0.76–1.27)
EOSINOPHIL # BLD AUTO: 0.1 X10E3/UL (ref 0–0.4)
EOSINOPHIL NFR BLD AUTO: 1 %
ERYTHROCYTE [DISTWIDTH] IN BLOOD BY AUTOMATED COUNT: 14.9 % (ref 12.3–15.4)
EST. AVERAGE GLUCOSE BLD GHB EST-MCNC: 123 MG/DL
GFR SERPLBLD CREATININE-BSD FMLA CKD-EPI: 102 ML/MIN/1.73
GFR SERPLBLD CREATININE-BSD FMLA CKD-EPI: 118 ML/MIN/1.73
GLOBULIN SER CALC-MCNC: 2.6 G/DL (ref 1.5–4.5)
GLUCOSE SERPL-MCNC: 88 MG/DL (ref 65–99)
GLUCOSE UR QL: NEGATIVE
HBA1C MFR BLD: 5.9 % (ref 4.8–5.6)
HCT VFR BLD AUTO: 45.4 % (ref 37.5–51)
HDLC SERPL-MCNC: 42 MG/DL
HGB BLD-MCNC: 15.4 G/DL (ref 13–17.7)
HGB UR QL STRIP: NEGATIVE
IMM GRANULOCYTES # BLD: 0 X10E3/UL (ref 0–0.1)
IMM GRANULOCYTES NFR BLD: 0 %
KETONES UR QL STRIP: NEGATIVE
LDLC SERPL CALC-MCNC: 157 MG/DL (ref 0–99)
LEUKOCYTE ESTERASE UR QL STRIP: NEGATIVE
LYMPHOCYTES # BLD AUTO: 1.3 X10E3/UL (ref 0.7–3.1)
LYMPHOCYTES NFR BLD AUTO: 24 %
MCH RBC QN AUTO: 29.7 PG (ref 26.6–33)
MCHC RBC AUTO-ENTMCNC: 33.9 G/DL (ref 31.5–35.7)
MCV RBC AUTO: 88 FL (ref 79–97)
MICRO URNS: NORMAL
MONOCYTES # BLD AUTO: 0.4 X10E3/UL (ref 0.1–0.9)
MONOCYTES NFR BLD AUTO: 7 %
NEUTROPHILS # BLD AUTO: 3.9 X10E3/UL (ref 1.4–7)
NEUTROPHILS NFR BLD AUTO: 68 %
NITRITE UR QL STRIP: NEGATIVE
PH UR STRIP: 6 [PH] (ref 5–7.5)
PLATELET # BLD AUTO: 273 X10E3/UL (ref 150–379)
POTASSIUM SERPL-SCNC: 4.7 MMOL/L (ref 3.5–5.2)
PROT SERPL-MCNC: 6.9 G/DL (ref 6–8.5)
PROT UR QL STRIP: NEGATIVE
PSA SERPL-MCNC: 1.1 NG/ML (ref 0–4)
RBC # BLD AUTO: 5.19 X10E6/UL (ref 4.14–5.8)
SODIUM SERPL-SCNC: 141 MMOL/L (ref 134–144)
SP GR UR: 1.03 (ref 1–1.03)
T4 FREE SERPL-MCNC: 1.15 NG/DL (ref 0.82–1.77)
TESTOST SERPL-MCNC: 267 NG/DL (ref 264–916)
TRIGL SERPL-MCNC: 63 MG/DL (ref 0–149)
TSH SERPL DL<=0.005 MIU/L-ACNC: 1.85 UIU/ML (ref 0.45–4.5)
UROBILINOGEN UR STRIP-MCNC: 0.2 MG/DL (ref 0.2–1)
VLDLC SERPL CALC-MCNC: 13 MG/DL (ref 5–40)
WBC # BLD AUTO: 5.7 X10E3/UL (ref 3.4–10.8)

## 2018-05-07 NOTE — PROGRESS NOTES
Your labs showed normal kidney and liver tests, blood counts, thyroid, urine test, PSA (prostate blood test). Your diabetes screening test showed persisting prediabetes, a little worse than it was 9 months ago. Your testosterone level was normal but on the lower side of normal at 267. Your cholesterol was a little high at 212; normal is less than 200. To lower cholesterol, work on diet, exercise, and weight loss if you are overweight. For exercise, aim for 30 minutes of activity 5 days a week. For diet, increase fruits, vegetables, and low-fat dairy products (such as low-fat yogurt, 1% or skim milk), and decrease fried foods, fast foods, and red meats such as beef, tyler, sausage, hot dogs, and pork.

## 2018-05-08 ENCOUNTER — DOCUMENTATION ONLY (OUTPATIENT)
Dept: INTERNAL MEDICINE CLINIC | Facility: CLINIC | Age: 48
End: 2018-05-08

## 2018-05-08 ENCOUNTER — TELEPHONE (OUTPATIENT)
Dept: INTERNAL MEDICINE CLINIC | Facility: CLINIC | Age: 48
End: 2018-05-08

## 2018-05-08 NOTE — TELEPHONE ENCOUNTER
----- Message from Tracy Cowan LPN sent at 4/9/0665  3:20 PM EDT -----  Regarding: FW: Dr. Frederick Olson      ----- Message -----     From: Kali Bhagat     Sent: 5/7/2018   7:39 AM       To: Tracy Cowan LPN, #  Subject: FW: Dr. Frederick Olson                              ----- Message -----     From: Fabian Potts     Sent: 5/4/2018  10:03 PM       To: [facility] Front Office  Subject: Dr. Frederick Olson                              Pt requesting a call back in regards to test results. Pt's best contact number [phone].

## 2018-07-02 ENCOUNTER — OFFICE VISIT (OUTPATIENT)
Dept: INTERNAL MEDICINE CLINIC | Facility: CLINIC | Age: 48
End: 2018-07-02

## 2018-07-02 VITALS
WEIGHT: 168 LBS | DIASTOLIC BLOOD PRESSURE: 73 MMHG | RESPIRATION RATE: 16 BRPM | SYSTOLIC BLOOD PRESSURE: 137 MMHG | OXYGEN SATURATION: 98 % | HEIGHT: 62 IN | BODY MASS INDEX: 30.91 KG/M2 | HEART RATE: 72 BPM | TEMPERATURE: 98.5 F

## 2018-07-02 DIAGNOSIS — I10 ESSENTIAL HYPERTENSION: Primary | ICD-10-CM

## 2018-07-02 RX ORDER — AMLODIPINE AND BENAZEPRIL HYDROCHLORIDE 5; 20 MG/1; MG/1
1 CAPSULE ORAL DAILY
Qty: 30 CAP | Refills: 5 | Status: SHIPPED | OUTPATIENT
Start: 2018-07-02 | End: 2018-08-17 | Stop reason: SDUPTHER

## 2018-07-02 NOTE — PROGRESS NOTES
CC:   Chief Complaint   Patient presents with    Hypertension     follow up       [facility]  Mary Alice Lomeli is a 53 y.o. male. Presents for 2 month follow up evaluation of BP. He has HTN, hyperlipidemia, hypogonadism (absent left testicle, atrophic right testicle), and ED. Was diagnosed with essential HTN at last clinic visit (BP was 143/88), and he was started on amlodipine 5 mg daily. Tolerating amlodipine with no side effects. Staying on a low-salt diet. Today BP is 137/73. Exercises regularly by riding bicycle and walking. Denies headaches, chest pain, dyspnea, dizziness, constipation, or leg swelling. Soc Hx  . Has 2 daughters. Lives alone in a house. Works as a . Never smoker. Drinks beer occasionally. Denies recreational drug use. Drinks mostly water; infrequently drinks Diet sodas and energy drinks.          Patient Active Problem List   Diagnosis Code    FH: CAD (coronary artery disease) Z82.49    Hyperlipidemia E78.5    Vitamin D deficiency E55.9    Prediabetes R73.03    Hypogonadism in male E29.1    Erectile dysfunction N52.9    Family history of prostate cancer Z80.42     Past Medical History:   Diagnosis Date    FH: CAD (coronary artery disease) 10/17/2011    Undescended right testicle 10/17/2011     No Known Allergies    Current Outpatient Prescriptions   Medication Sig Dispense Refill    amLODIPine (NORVASC) 5 mg tablet Take 1 Tab by mouth daily. Indications: hypertension 30 Tab 2    testosterone cypionate (DEPOTESTOTERONE CYPIONATE) 200 mg/mL injection getting every 2 weeks  0    aspirin 81 mg chewable tablet Take 81 mg by mouth daily. PHYSICAL EXAM  Visit Vitals    /73 (BP 1 Location: Left arm, BP Patient Position: Sitting)    Pulse 72    Temp 98.5 °F (36.9 °C) (Oral)    Resp 16    Ht 5' 2\" (1.575 m)    Wt 168 lb (76.2 kg)    SpO2 98%    BMI 30.73 kg/m2       General: Obese, no distress.   HEENT:  Head normocephalic/atraumatic, no scleral icterus  Neck: Supple. No carotid bruits, JVD, lymphadenopathy, or thyromegaly. Lungs:  Clear to ausculation bilaterally. Good air movement. Heart:  Regular rate and rhythm, normal S1 and S2, no murmur, gallop, or rub  Extremities: No clubbing, cyanosis, or edema. Neurological: Alert and oriented. Psychiatric: Normal mood and affect. Behavior is normal.         ASSESSMENT AND PLAN    ICD-10-CM ICD-9-CM    1. Essential hypertension I10 401.9 amLODIPine-benazepril (LOTREL) 5-20 mg per capsule       Diagnoses and all orders for this visit:    1. Essential hypertension  /73; not at goal of less than 130/80. Will change from amlodipine 5 mg daily to amlodipine-benazepril. Amlodipine not increased to 10 mg daily because he is already at risk for leg swelling working as a . -     Start amLODIPine-benazepril (LOTREL) 5-20 mg per capsule; Take 1 Cap by mouth daily. Follow-up Disposition:  Return in about 3 months (around 10/2/2018), or if symptoms worsen or fail to improve, for HTN. Provided patient and/or family with advanced directive information and answered pertinent questions. Encouraged patient to provide a copy of advanced directive to the office when available. I have discussed the diagnosis with the patient and the intended plan as seen in the above orders. Patient is in agreement. The patient has received an after-visit summary and questions were answered concerning future plans. I have discussed medication side effects and warnings with the patient as well.

## 2018-07-02 NOTE — MR AVS SNAPSHOT
[address] [address] [phone] Patient: Sergo Henriquez MRN: [id number] :1970 Visit Information Date & Time Provider Department Dept. Phone Encounter #  
 2018  9:10 AM Juliano Pinto MD [facility] Internal Medicine [facility] [phone] [id number] Follow-up Instructions Return in about 3 months (around 10/2/2018), or if symptoms worsen or fail to improve, for HTN. Upcoming Health Maintenance Date Due Influenza Age 5 to Adult 2018 DTaP/Tdap/Td series (2 - Td) 10/17/2021 Allergies as of 2018  Review Complete On: 2018 By: Juliano Pinto MD  
 No Known Allergies Current Immunizations  Reviewed on 3/16/2015 Name Date Influenza Vaccine Split 10/17/2011 TDAP Vaccine 10/17/2011 Not reviewed this visit You Were Diagnosed With   
  
 Codes Comments Essential hypertension    -  Primary ICD-10-CM: I10 
ICD-9-CM: 401.9 Vitals BP Pulse Temp Resp Height(growth percentile) Weight(growth percentile)  
 137/73 (BP 1 Location: Left arm, BP Patient Position: Sitting) 72 98.5 °F (36.9 °C) (Oral) 16 5' 2\" (1.575 m) 168 lb (76.2 kg) SpO2 BMI Smoking Status 98% 30.73 kg/m2 Never Smoker BMI and BSA Data Body Mass Index Body Surface Area 30.73 kg/m 2 1.83 m 2 Preferred Pharmacy Pharmacy Name Phone [id number], [address] [id number] [phone] Your Updated Medication List  
  
   
This list is accurate as of 18  9:35 AM.  Always use your most recent med list. amLODIPine-benazepril 5-20 mg per capsule Commonly known as:  Jamia Richmond Take 1 Cap by mouth daily. aspirin 81 mg chewable tablet Take 81 mg by mouth daily. testosterone cypionate 200 mg/mL injection Commonly known as:  DEPOTESTOTERONE CYPIONATE  
getting every 2 weeks Prescriptions Sent to Pharmacy Refills  
 amLODIPine-benazepril (LOTREL) 5-20 mg per capsule 5 Sig: Take 1 Cap by mouth daily. Class: Normal  
 Pharmacy: [id number] Rodolfo Ludwig Ph #: [phone] Route: Oral  
  
Follow-up Instructions Return in about 3 months (around 10/2/2018), or if symptoms worsen or fail to improve, for HTN. Patient Instructions Low Sodium Diet (2,000 Milligram): Care Instructions Your Care Instructions Too much sodium causes your body to hold on to extra water. This can raise your blood pressure and force your heart and kidneys to work harder. In very serious cases, this could cause you to be put in the hospital. It might even be life-threatening. By limiting sodium, you will feel better and lower your risk of serious problems. The most common source of sodium is salt. People get most of the salt in their diet from canned, prepared, and packaged foods. Fast food and restaurant meals also are very high in sodium. Your doctor will probably limit your sodium to less than 2,000 milligrams (mg) a day. This limit counts all the sodium in prepared and packaged foods and any salt you add to your food. Follow-up care is a key part of your treatment and safety. Be sure to make and go to all appointments, and call your doctor if you are having problems. It's also a good idea to know your test results and keep a list of the medicines you take. How can you care for yourself at home? Read food labels · Read labels on cans and food packages. The labels tell you how much sodium is in each serving. Make sure that you look at the serving size. If you eat more than the serving size, you have eaten more sodium. · Food labels also tell you the Percent Daily Value for sodium. Choose products with low Percent Daily Values for sodium.  
· Be aware that sodium can come in forms other than salt, including monosodium glutamate (MSG), sodium citrate, and sodium bicarbonate (baking soda). MSG is often added to Asian food. When you eat out, you can sometimes ask for food without MSG or added salt. Buy low-sodium foods · Buy foods that are labeled \"unsalted\" (no salt added), \"sodium-free\" (less than 5 mg of sodium per serving), or \"low-sodium\" (less than 140 mg of sodium per serving). Foods labeled \"reduced-sodium\" and \"light sodium\" may still have too much sodium. Be sure to read the label to see how much sodium you are getting. · Buy fresh vegetables, or frozen vegetables without added sauces. Buy low-sodium versions of canned vegetables, soups, and other canned goods. Prepare low-sodium meals · Cut back on the amount of salt you use in cooking. This will help you adjust to the taste. Do not add salt after cooking. One teaspoon of salt has about 2,300 mg of sodium. · Take the salt shaker off the table. · Flavor your food with garlic, lemon juice, onion, vinegar, herbs, and spices. Do not use soy sauce, lite soy sauce, steak sauce, onion salt, garlic salt, celery salt, mustard, or ketchup on your food. · Use low-sodium salad dressings, sauces, and ketchup. Or make your own salad dressings and sauces without adding salt. · Use less salt (or none) when recipes call for it. You can often use half the salt a recipe calls for without losing flavor. Other foods such as rice, pasta, and grains do not need added salt. · Rinse canned vegetables, and cook them in fresh water. This removes some-but not all-of the salt. · Avoid water that is naturally high in sodium or that has been treated with water softeners, which add sodium. Call your local water company to find out the sodium content of your water supply. If you buy bottled water, read the label and choose a sodium-free brand. Avoid high-sodium foods · Avoid eating: ¨ Smoked, cured, salted, and canned meat, fish, and poultry. ¨ Ham, tyler, hot dogs, and luncheon meats. ¨ Regular, hard, and processed cheese and regular peanut butter. ¨ Crackers with salted tops, and other salted snack foods such as pretzels, chips, and salted popcorn. ¨ Frozen prepared meals, unless labeled low-sodium. ¨ Canned and dried soups, broths, and bouillon, unless labeled sodium-free or low-sodium. ¨ Canned vegetables, unless labeled sodium-free or low-sodium. ¨ Western Andie fries, pizza, tacos, and other fast foods. ¨ Pickles, olives, ketchup, and other condiments, especially soy sauce, unless labeled sodium-free or low-sodium. Where can you learn more? Go to http://amaris-jay.info/. Enter K827 in the search box to learn more about \"Low Sodium Diet (2,000 Milligram): Care Instructions. \" Current as of: May 12, 2017 Content Version: 11.4 © 2638-3194 [employer]. Care instructions adapted under license by [employer] (which disclaims liability or warranty for this information). If you have questions about a medical condition or this instruction, always ask your healthcare professional. [place] [address] any warranty or liability for your use of this information. Introducing [facility]! [employer] introduces Fanaticall patient portal. Now you can access parts of your medical record, email your doctor's office, and request medication refills online. 1. In your internet browser, go to [email or web address] 2. Click on the First Time User? Click Here link in the Sign In box. You will see the New Member Sign Up page. 3. Enter your Fanaticall Access Code exactly as it appears below. You will not need to use this code after youve completed the sign-up process. If you do not sign up before the expiration date, you must request a new code. · Fanaticall Access Code: [email or web address] Expires: 7/30/2018  9:13 AM 
 
 4. Enter the last four digits of your Social Security Number (xxxx) and Date of Birth (mm/dd/yyyy) as indicated and click Submit. You will be taken to the next sign-up page. 5. Create a [employer] ID. This will be your [employer] login ID and cannot be changed, so think of one that is secure and easy to remember. 6. Create a [employer] password. You can change your password at any time. 7. Enter your Password Reset Question and Answer. This can be used at a later time if you forget your password. 8. Enter your e-mail address. You will receive e-mail notification when new information is available in [address] 9. Click Sign Up. You can now view and download portions of your medical record. 10. Click the Download Summary menu link to download a portable copy of your medical information. If you have questions, please visit the Frequently Asked Questions section of the [employer] website. Remember, [employer] is NOT to be used for urgent needs. For medical emergencies, dial 911. Now available from your iPhone and Android! Please provide this summary of care documentation to your next provider. Your primary care clinician is listed as Surekha Bray. If you have any questions after today's visit, please call [phone].

## 2018-07-02 NOTE — PATIENT INSTRUCTIONS

## 2018-07-02 NOTE — PROGRESS NOTES
Octavio Asif  Identified pt with two pt identifiers(name and ). Chief Complaint   Patient presents with    Hypertension     follow up       1. Have you been to the ER, urgent care clinic since your last visit? Hospitalized since your last visit? NO    2. Have you seen or consulted any other health care providers outside of the [facility] since your last visit? Include any pap smears or colon screening. NO    Today's provider has been notified of reason for visit, vitals and flowsheets obtained on patients. Patient received paperwork for advance directive during previous visit but has not completed at this time     Reviewed record In preparation for visit, huddled with provider and have obtained necessary documentation      There are no preventive care reminders to display for this patient. Wt Readings from Last 3 Encounters:   18 171 lb (77.6 kg)   10/23/17 175 lb (79.4 kg)   17 170 lb (77.1 kg)     Temp Readings from Last 3 Encounters:   18 98.2 °F (36.8 °C) (Oral)   10/23/17 98.3 °F (36.8 °C) (Oral)   17 98.5 °F (36.9 °C) (Oral)     BP Readings from Last 3 Encounters:   18 143/88   10/23/17 138/78   17 130/83     Pulse Readings from Last 3 Encounters:   18 79   10/23/17 74   17 76     There were no vitals filed for this visit. Learning Assessment:  :     Learning Assessment 3/16/2015   PRIMARY LEARNER Patient   HIGHEST LEVEL OF EDUCATION - PRIMARY LEARNER  GRADUATED HIGH SCHOOL OR GED   BARRIERS PRIMARY LEARNER NONE   CO-LEARNER CAREGIVER No   PRIMARY LANGUAGE ENGLISH   LEARNER PREFERENCE PRIMARY LISTENING   ANSWERED BY self   RELATIONSHIP SELF       Depression Screening:  :     PHQ over the last two weeks 2018   Little interest or pleasure in doing things Not at all   Feeling down, depressed or hopeless Not at all   Total Score PHQ 2 0       Fall Risk Assessment:  :     No flowsheet data found.     Abuse Screening:  :     No flowsheet data found. ADL Screening:  :     ADL Assessment 5/1/2018   Feeding yourself No Help Needed   Getting from bed to chair No Help Needed   Getting dressed No Help Needed   Bathing or showering No Help Needed   Walk across the room (includes cane/walker) No Help Needed   Using the telphone No Help Needed   Taking your medications No Help Needed   Preparing meals No Help Needed   Managing money (expenses/bills) No Help Needed   Moderately strenuous housework (laundry) No Help Needed   Shopping for personal items (toiletries/medicines) No Help Needed   Shopping for groceries No Help Needed   Driving No Help Needed   Climbing a flight of stairs No Help Needed   Getting to places beyond walking distances No Help Needed                 Medication reconciliation up to date and corrected with patient at this time.

## 2018-08-17 ENCOUNTER — OFFICE VISIT (OUTPATIENT)
Dept: INTERNAL MEDICINE CLINIC | Facility: CLINIC | Age: 48
End: 2018-08-17

## 2018-08-17 VITALS
HEIGHT: 62 IN | HEART RATE: 71 BPM | BODY MASS INDEX: 31.17 KG/M2 | RESPIRATION RATE: 16 BRPM | TEMPERATURE: 98.5 F | SYSTOLIC BLOOD PRESSURE: 160 MMHG | OXYGEN SATURATION: 96 % | DIASTOLIC BLOOD PRESSURE: 98 MMHG | WEIGHT: 169.4 LBS

## 2018-08-17 DIAGNOSIS — B36.0 TINEA VERSICOLOR: Primary | ICD-10-CM

## 2018-08-17 DIAGNOSIS — Z11.3 ROUTINE SCREENING FOR STI (SEXUALLY TRANSMITTED INFECTION): ICD-10-CM

## 2018-08-17 RX ORDER — KETOCONAZOLE 20 MG/G
CREAM TOPICAL DAILY
Qty: 15 G | Refills: 0 | Status: SHIPPED | OUTPATIENT
Start: 2018-08-17 | End: 2019-01-08

## 2018-08-17 NOTE — PROGRESS NOTES
CHRISTINA Ny is a 53y.o. year old male patient of Harinder Clark MD who presents with c/o rash to back, left side pain, and wants to check for STDs. Pt has history of has FH: CAD (coronary artery disease), Hyperlipidemia, Vitamin D deficiency, Prediabetes, Hypogonadism in male, Erectile dysfunction, and Family history of prostate cancer on his problem list..    C/o rash to top and middle of back, noticed it a week ago. Itches a lot. Nowhere else on body. Feels like its getting worse. Tried some OTC cream, didn't help. Never had anything similar. Pt also reports a small lesion in the middle of the lower rash. Wants to be checked for STDs. Denies any current symptoms, denies any discharge or pain. Partner is having some cramping after sex. Denies any blood in urine. Denies any abnormal lesions on genitalia. Wants to include HIV testing.       Patient Active Problem List   Diagnosis Code    FH: CAD (coronary artery disease) Z82.49    Hyperlipidemia E78.5    Vitamin D deficiency E55.9    Prediabetes R73.03    Hypogonadism in male E29.1    Erectile dysfunction N52.9    Family history of prostate cancer Z80.42     Past Medical History:   Diagnosis Date    FH: CAD (coronary artery disease) 10/17/2011    Undescended right testicle 10/17/2011     Past Surgical History:   Procedure Laterality Date    HX ORTHOPAEDIC  1991    right middle finger amputation     Social History     Social History    Marital status:      Spouse name: N/A    Number of children: N/A    Years of education: N/A     Social History Main Topics    Smoking status: Never Smoker    Smokeless tobacco: Never Used    Alcohol use Yes      Comment: socially     Drug use: No    Sexual activity: Not Asked     Other Topics Concern    None     Social History Narrative     Family History   Problem Relation Age of Onset    Diabetes Brother     Heart Disease Brother     Diabetes Father     Hypertension Father     Diabetes Paternal Grandmother     No Known Problems Mother      No Known Allergies    MEDICATIONS  Current Outpatient Prescriptions   Medication Sig    amLODIPine-benazepril (LOTREL) 5-20 mg per capsule Take 1 Cap by mouth daily.  testosterone cypionate (DEPOTESTOTERONE CYPIONATE) 200 mg/mL injection getting every 2 weeks    aspirin 81 mg chewable tablet Take 81 mg by mouth daily. No current facility-administered medications for this visit. REVIEW OF SYSTEMS  Per HPI        Visit Vitals    BP (!) 160/98    Pulse 71    Temp 98.5 °F (36.9 °C) (Oral)    Resp 16    Ht 5' 2\" (1.575 m)    Wt 169 lb 6.4 oz (76.8 kg)    SpO2 96%    BMI 30.98 kg/m2         General: Well-developed, well-nourished. In no distress. A&O x 3. Head: Normocephalic, atraumatic. Eyes: Conjunctiva clear. Back: Symmetric. ROM intact. Abdomen: Soft, non-distended  Skin: Pt has diffuse maculopapular rash to upper back and middle of lower back. There is a small papule approx 3mm in middle of lower back rash. Musculoskeletal: Gait normal.   Psychiatric: Normal mood and affect. Behavior is normal.         ASSESSMENT and PLAN  Diagnoses and all orders for this visit:    1. Tinea versicolor  -     ketoconazole (NIZORAL) 2 % topical cream; Apply  to affected area daily. -Advised pt contact office if not improving  -Advised pt if not improving or if small papule does not improve would recommend f/u with Dermatology for evaluation     2. Routine screening for STI (sexually transmitted infection)  -     CT/NG/T.VAGINALIS AMPLIFICATION  -     HIV 1/2 AG/AB, 4TH GENERATION,W RFLX CONFIRM            Patient Instructions          Tinea Versicolor: Care Instructions  Your Care Instructions  Tinea versicolor is a skin infection caused by a yeast (fungus). It causes many small spots, usually on the chest and back. The spotted skin can be flaky or scaly. The spots do not tan in the sun, so they are lighter than the skin around them.  Some spots may be darker than the skin around them. The yeast that causes tinea versicolor normally lives on your skin. But it becomes a problem only when warmth and humidity allow the yeast to grow rapidly and increase in number. Some people are more likely to get tinea versicolor. It does not spread from person to person. Tinea versicolor usually gets better as you age. You can treat tinea versicolor with cream or ointment that kills the yeast. You may need pills to kill the fungus if the spots cover a lot of your body. Although treatment kills the yeast quickly, your skin may not return to normal for months after treatment. You can get this condition again after treatment. Follow-up care is a key part of your treatment and safety. Be sure to make and go to all appointments, and call your doctor if you are having problems. It's also a good idea to know your test results and keep a list of the medicines you take. How can you care for yourself at home? · Follow the directions for use of creams, shampoos, or solutions. You will probably need to use them for 1 to 2 weeks. If your skin gets irritated, stop using the product, and call your doctor. · To prevent tinea versicolor, use a cream, shampoo, or solution one time a month. Your doctor may prescribe pills to prevent the spots from returning. · Dry off well after bathing. Keep your skin clean and dry. · Always wear sunscreen on exposed skin. Make sure to use a broad-spectrum sunscreen that has a sun protection factor (SPF) of 30 or higher. Use it every day, even when it is cloudy. · If you keep getting tinea versicolor, wash your clothes in very hot water to kill the yeast.  When should you call for help? Call your doctor now or seek immediate medical care if:    · You have signs of infection such as:  ¨ Pain, warmth, or swelling in your skin. ¨ Red streaks near a wound in your skin. ¨ Pus coming from a wound in your skin.   ¨ A fever.    Watch closely for changes in your health, and be sure to contact your doctor if:    · Your skin condition does not improve in 2 weeks.     · You do not get better as expected. Where can you learn more? Go to http://amaris-jay.info/. Enter U417 in the search box to learn more about \"Tinea Versicolor: Care Instructions. \"  Current as of: October 5, 2017  Content Version: 11.7  © 2049-9835 [employer]. Care instructions adapted under license by [employer] (which disclaims liability or warranty for this information). If you have questions about a medical condition or this instruction, always ask your healthcare professional. [place] [address] any warranty or liability for your use of this information. Please keep your follow-up appointment with Luis Stewart MD.     Follow-up Disposition:  Return if symptoms worsen or fail to improve. Health Maintenance Due   Topic Date Due    Influenza Age 5 to Adult  08/01/2018       I have discussed the diagnosis with the patient and the intended plan as seen in the above orders. Patient is in agreement. The patient has received an after-visit summary and questions were answered concerning future plans. I have discussed medication side effects and warnings with the patient as well. Warning signs for the above conditions were discussed including when to call our office or go to the emergency room. The nurse provided the patient and/or family with advanced directive information if needed and encouraged the patient to provide a copy to the office when available.

## 2018-08-17 NOTE — PATIENT INSTRUCTIONS
Tinea Versicolor: Care Instructions  Your Care Instructions  Tinea versicolor is a skin infection caused by a yeast (fungus). It causes many small spots, usually on the chest and back. The spotted skin can be flaky or scaly. The spots do not tan in the sun, so they are lighter than the skin around them. Some spots may be darker than the skin around them. The yeast that causes tinea versicolor normally lives on your skin. But it becomes a problem only when warmth and humidity allow the yeast to grow rapidly and increase in number. Some people are more likely to get tinea versicolor. It does not spread from person to person. Tinea versicolor usually gets better as you age. You can treat tinea versicolor with cream or ointment that kills the yeast. You may need pills to kill the fungus if the spots cover a lot of your body. Although treatment kills the yeast quickly, your skin may not return to normal for months after treatment. You can get this condition again after treatment. Follow-up care is a key part of your treatment and safety. Be sure to make and go to all appointments, and call your doctor if you are having problems. It's also a good idea to know your test results and keep a list of the medicines you take. How can you care for yourself at home? · Follow the directions for use of creams, shampoos, or solutions. You will probably need to use them for 1 to 2 weeks. If your skin gets irritated, stop using the product, and call your doctor. · To prevent tinea versicolor, use a cream, shampoo, or solution one time a month. Your doctor may prescribe pills to prevent the spots from returning. · Dry off well after bathing. Keep your skin clean and dry. · Always wear sunscreen on exposed skin. Make sure to use a broad-spectrum sunscreen that has a sun protection factor (SPF) of 30 or higher. Use it every day, even when it is cloudy.   · If you keep getting tinea versicolor, wash your clothes in very hot water to kill the yeast.  When should you call for help? Call your doctor now or seek immediate medical care if:    · You have signs of infection such as:  ¨ Pain, warmth, or swelling in your skin. ¨ Red streaks near a wound in your skin. ¨ Pus coming from a wound in your skin. ¨ A fever.    Watch closely for changes in your health, and be sure to contact your doctor if:    · Your skin condition does not improve in 2 weeks.     · You do not get better as expected. Where can you learn more? Go to http://amaris-jay.info/. Enter X873 in the search box to learn more about \"Tinea Versicolor: Care Instructions. \"  Current as of: October 5, 2017  Content Version: 11.7  © 1771-5224 [employer] ([employer]. Care instructions adapted under license by [employer] (which disclaims liability or warranty for this information). If you have questions about a medical condition or this instruction, always ask your healthcare professional. [address] any warranty or liability for your use of this information.

## 2018-08-17 NOTE — PROGRESS NOTES
Lynne Hoover  Identified pt with two pt identifiers(name and ). Chief Complaint   Patient presents with    Rash     Back about a week / lower and mid center    Side Pain     left side burning    Exposure to STD     check for std       Reviewed record In preparation for visit and have obtained necessary documentation. Has info on advanced directive but has not filled them out. 1. Have you been to the ER, urgent care clinic or hospitalized since your last visit? No     2. Have you seen or consulted any other health care providers outside of the [address] [place] [address] since your last visit? Include any pap smears or colon screening. No    Vitals reviewed with provider.     Health Maintenance reviewed:     Health Maintenance Due   Topic    Influenza Age 5 to Adult    asur  Wt Readings from Last 3 Encounters:   18 169 lb 6.4 oz (76.8 kg)   18 168 lb (76.2 kg)   18 171 lb (77.6 kg)   e in doing things Not at all   Feeling down, depressed, irritable, or hopeless Not at all   Total Score PHQ 2 0        N  Temp Readings from Last 3 Encounters:   18 98.5 °F (36.9 °C) (Oral)   18 98.5 °F (36.9 °C) (Oral)   18 98.2 °F (36.8 °C) (Oral)   o Help Needed    No Help Needed    No Help Needed    No Help Needed            BP Readings from Last 3 Encounters:   18 (!) 160/98   18 137/73   18 143/88               N  Learning Assessment 3/16/2015   PRIMARY LEARNER Patient   HIGHEST LEVEL OF EDUCATION - PRIMARY LEARNER  GRADUATED HIGH SCHOOL OR GED   BARRIERS PRIMARY LEARNER NONE   CO-LEARNER CAREGIVER No   PRIMARY LANGUAGE ENGLISH   LEARNER PREFERENCE PRIMARY LISTENING   ANSWERED BY self   RELATIONSHIP SELF   ittle interest or pleasure in doing things Not at all   Feeling down, depressed, irritable, or hopeless Not at all   Total Score PHQ 2 0

## 2018-08-17 NOTE — MR AVS SNAPSHOT
[address] [address] [phone] Patient: Mel Hong MRN: [id number] :1970 Visit Information Date & Time Provider Department Dept. Phone Encounter #  
 2018  8:30 AM [address] [facility] of [address] [place] [address] [id number] Follow-up Instructions Return if symptoms worsen or fail to improve. Follow-up and Disposition History Your Appointments 10/8/2018  7:50 AM  
ESTABLISHED PATIENT with Sary Dickson MD  
[facility] of [facility] 18 ([facility]) Appt Note: Htn $20cp 18 k  
 [address] [address] [phone]  
  
   
 [address] [facility] Upcoming Health Maintenance Date Due Influenza Age 5 to Adult 2018 DTaP/Tdap/Td series (2 - Td) 10/17/2021 Allergies as of 2018  Review Complete On: 2018 By: Cari Wong NP No Known Allergies Current Immunizations  Reviewed on 2018 Name Date Influenza Vaccine Split 10/17/2011 TDAP Vaccine 10/17/2011 Reviewed by Elvis Coelho LPN on  at  8:25 AM  
You Were Diagnosed With   
  
 Codes Comments Tinea versicolor    -  Primary ICD-10-CM: B36.0 ICD-9-CM: 111.0 Routine screening for STI (sexually transmitted infection)     ICD-10-CM: Z11.3 ICD-9-CM: V74.5 Vitals BP Pulse Temp Resp Height(growth percentile) Weight(growth percentile) (!) 160/98 71 98.5 °F (36.9 °C) (Oral) 16 5' 2\" (1.575 m) 169 lb 6.4 oz (76.8 kg) SpO2 BMI Smoking Status 96% 30.98 kg/m2 Never Smoker Vitals History BMI and BSA Data Body Mass Index Body Surface Area 30.98 kg/m 2 1.83 m 2 Preferred Pharmacy Pharmacy Name Phone [id number] [address] [address] [phone] Your Updated Medication List  
  
   
 This list is accurate as of 8/17/18  8:57 AM.  Always use your most recent med list. amLODIPine-benazepril 5-20 mg per capsule Commonly known as:  [place] Markesan Take 1 Cap by mouth daily. aspirin 81 mg chewable tablet Take 81 mg by mouth daily. ketoconazole 2 % topical cream  
Commonly known as:  NIZORAL Apply  to affected area daily. testosterone cypionate 200 mg/mL injection Commonly known as:  DEPOTESTOTERONE CYPIONATE  
getting every 2 weeks Prescriptions Sent to Pharmacy Refills  
 ketoconazole (NIZORAL) 2 % topical cream 0 Sig: Apply  to affected area daily. Class: Normal  
 Pharmacy: [id number] [place], [address] Ph #: [phone] Route: Topical  
  
We Performed the Following CT/NG/T.VAGINALIS AMPLIFICATION [id number] CPT(R)] HIV 1/2 AG/AB, 4TH GENERATION,W RFLX CONFIRM [id number] CPT(R)] Follow-up Instructions Return if symptoms worsen or fail to improve. Patient Instructions Tinea Versicolor: Care Instructions Your Care Instructions Tinea versicolor is a skin infection caused by a yeast (fungus). It causes many small spots, usually on the chest and back. The spotted skin can be flaky or scaly. The spots do not tan in the sun, so they are lighter than the skin around them. Some spots may be darker than the skin around them. The yeast that causes tinea versicolor normally lives on your skin. But it becomes a problem only when warmth and humidity allow the yeast to grow rapidly and increase in number. Some people are more likely to get tinea versicolor. It does not spread from person to person. Tinea versicolor usually gets better as you age. You can treat tinea versicolor with cream or ointment that kills the yeast. You may need pills to kill the fungus if the spots cover a lot of your body.  Although treatment kills the yeast quickly, your skin may not return to normal for months after treatment. You can get this condition again after treatment. Follow-up care is a key part of your treatment and safety. Be sure to make and go to all appointments, and call your doctor if you are having problems. It's also a good idea to know your test results and keep a list of the medicines you take. How can you care for yourself at home? · Follow the directions for use of creams, shampoos, or solutions. You will probably need to use them for 1 to 2 weeks. If your skin gets irritated, stop using the product, and call your doctor. · To prevent tinea versicolor, use a cream, shampoo, or solution one time a month. Your doctor may prescribe pills to prevent the spots from returning. · Dry off well after bathing. Keep your skin clean and dry. · Always wear sunscreen on exposed skin. Make sure to use a broad-spectrum sunscreen that has a sun protection factor (SPF) of 30 or higher. Use it every day, even when it is cloudy. · If you keep getting tinea versicolor, wash your clothes in very hot water to kill the yeast. 
When should you call for help? Call your doctor now or seek immediate medical care if: 
  · You have signs of infection such as: 
¨ Pain, warmth, or swelling in your skin. ¨ Red streaks near a wound in your skin. ¨ Pus coming from a wound in your skin. ¨ A fever.  
 Watch closely for changes in your health, and be sure to contact your doctor if: 
  · Your skin condition does not improve in 2 weeks.  
  · You do not get better as expected. Where can you learn more? Go to http://amaris-jay.info/. Enter X232 in the search box to learn more about \"Tinea Versicolor: Care Instructions. \" Current as of: October 5, 2017 Content Version: 11.7 © 6765-3656 [employer], [place].  Care instructions adapted under license by Crunched (which disclaims liability or warranty for this information). If you have questions about a medical condition or this instruction, always ask your healthcare professional. [address] any warranty or liability for your use of this information. Introducing [facility] HEALTH SERVICES! [facility] introduces WorldDesk patient portal. Now you can access parts of your medical record, email your doctor's office, and request medication refills online. 1. In your internet browser, go to [email or web address] 2. Click on the First Time User? Click Here link in the Sign In box. You will see the New Member Sign Up page. 3. Enter your WorldDesk Access Code exactly as it appears below. You will not need to use this code after youve completed the sign-up process. If you do not sign up before the expiration date, you must request a new code. · WorldDesk Access Code: [email or web address] Expires: 11/15/2018  8:57 AM 
 
4. Enter the last four digits of your Social Security Number (xxxx) and Date of Birth (mm/dd/yyyy) as indicated and click Submit. You will be taken to the next sign-up page. 5. Create a WorldDesk ID. This will be your WorldDesk login ID and cannot be changed, so think of one that is secure and easy to remember. 6. Create a WorldDesk password. You can change your password at any time. 7. Enter your Password Reset Question and Answer. This can be used at a later time if you forget your password. 8. Enter your e-mail address. You will receive e-mail notification when new information is available in [address] 9. Click Sign Up. You can now view and download portions of your medical record. 10. Click the Download Summary menu link to download a portable copy of your medical information. If you have questions, please visit the Frequently Asked Questions section of the WorldDesk website. Remember, WorldDesk is NOT to be used for urgent needs. For medical emergencies, dial 911. Now available from your iPhone and Android! Please provide this summary of care documentation to your next provider. Your primary care clinician is listed as [place]. If you have any questions after today's visit, please call [phone].

## 2018-08-18 LAB
C TRACH RRNA SPEC QL NAA+PROBE: NEGATIVE
HIV 1+2 AB+HIV1 P24 AG SERPL QL IA: NON REACTIVE
N GONORRHOEA RRNA SPEC QL NAA+PROBE: NEGATIVE
T VAGINALIS RRNA SPEC QL NAA+PROBE: NEGATIVE

## 2018-08-20 NOTE — PROGRESS NOTES
Verified two patient identifiers, name and . Patient provided with lab results and Np message. No questions at this time.

## 2019-01-08 ENCOUNTER — OFFICE VISIT (OUTPATIENT)
Dept: INTERNAL MEDICINE CLINIC | Facility: CLINIC | Age: 49
End: 2019-01-08

## 2019-01-08 VITALS
BODY MASS INDEX: 29.63 KG/M2 | HEART RATE: 85 BPM | SYSTOLIC BLOOD PRESSURE: 148 MMHG | RESPIRATION RATE: 16 BRPM | DIASTOLIC BLOOD PRESSURE: 85 MMHG | TEMPERATURE: 98.8 F | HEIGHT: 62 IN | WEIGHT: 161 LBS | OXYGEN SATURATION: 96 %

## 2019-01-08 DIAGNOSIS — Z80.42 FAMILY HISTORY OF PROSTATE CANCER: ICD-10-CM

## 2019-01-08 DIAGNOSIS — R73.03 PREDIABETES: ICD-10-CM

## 2019-01-08 DIAGNOSIS — Z11.3 SCREEN FOR STD (SEXUALLY TRANSMITTED DISEASE): ICD-10-CM

## 2019-01-08 DIAGNOSIS — I10 ESSENTIAL HYPERTENSION: Primary | ICD-10-CM

## 2019-01-08 DIAGNOSIS — E66.3 OVERWEIGHT (BMI 25.0-29.9): ICD-10-CM

## 2019-01-08 DIAGNOSIS — E78.2 MIXED HYPERLIPIDEMIA: ICD-10-CM

## 2019-01-08 RX ORDER — HYDROCHLOROTHIAZIDE 25 MG/1
25 TABLET ORAL DAILY
COMMUNITY
End: 2019-01-08 | Stop reason: SDUPTHER

## 2019-01-08 RX ORDER — HYDROCHLOROTHIAZIDE 25 MG/1
25 TABLET ORAL DAILY
Qty: 90 TAB | Refills: 1 | Status: SHIPPED | OUTPATIENT
Start: 2019-01-08 | End: 2019-01-26 | Stop reason: SDUPTHER

## 2019-01-08 RX ORDER — LISINOPRIL 10 MG/1
10 TABLET ORAL DAILY
Qty: 90 TAB | Refills: 1 | Status: SHIPPED | OUTPATIENT
Start: 2019-01-08 | End: 2019-07-02 | Stop reason: DRUGHIGH

## 2019-01-08 NOTE — PROGRESS NOTES
CC:  
Chief Complaint Patient presents with  Hypertension HISTORY OF PRESENT ILLNESS Batsheva Crooks is a 53 y.o. male. Presents for ED follow-up evaluation. He has HTN, hyperlipidemia, hypogonadism (absent left testicle, atrophic right testicle), and ED. Last seen in clinic by me on 7/20/18, did not follow up in 3 months as instructed; saw Bryn Wilburn NP at this clinic on 8/17/18 for rash on back diagnosed as tinea versicolor. He was seen at [address] [place] [address] ED on 1/1/19 with headaches and elevated BP of 209/119 at home. In ED, /105. BMP and CBC normal.  CT head negative. EKG normal. Given 2 doses of HCTZ 25 mg in ED and discharged on HCTZ 25 mg daily. Today he has no complaints. Reports compliance with HCTZ 25 mg daily; tolerating with no side effects. Denies headaches, CP, SOB, heart palpitations, or leg swelling. Brought in record of home BP readings: -165, DBP . At his last clinic visit on 8/17/18, he had been on amlodipine-benazepril 5-20 mg but states he stopped taking it on his own about 4 months because it was making him feel sleepy. He requests STD testing although I explained to him that it was done only 5 months; still wants to have testing because he has been having unprotected intercourse, denies penile discharge. Soc Hx . Has 2 daughters. Lives alone in a house. Works as a . Never smoker. Drinks beer occasionally. Denies recreational drug use. Drinks mostly water; infrequently drinks Diet sodas and energy drinks. Declined flu vaccine today. ROS A complete review of systems was performed and is negative except for those mentioned in the HPI. Patient Active Problem List  
Diagnosis Code  
 FH: CAD (coronary artery disease) Z82.49  
 Hyperlipidemia E78.5  Vitamin D deficiency E55.9  Prediabetes R73.03  
 Hypogonadism in male E29.1  Erectile dysfunction N52.9  Family history of prostate cancer Z80.42  
 
 Past Medical History:  
Diagnosis Date  FH: CAD (coronary artery disease) 10/17/2011  Undescended right testicle 10/17/2011 No Known Allergies Current Outpatient Medications Medication Sig Dispense Refill  hydroCHLOROthiazide (HYDRODIURIL) 25 mg tablet Take 1 Tab by mouth daily. 90 Tab 1  
 lisinopril (PRINIVIL, ZESTRIL) 10 mg tablet Take 1 Tab by mouth daily. 90 Tab 1  
 aspirin 81 mg chewable tablet Take 81 mg by mouth daily. PHYSICAL EXAM 
Visit Vitals /85 Pulse 85 Temp 98.8 °F (37.1 °C) (Oral) Resp 16 Ht 5' 2\" (1.575 m) Wt 161 lb (73 kg) SpO2 96% BMI 29.45 kg/m² General: Well-developed and well-nourished, no distress. HEENT:  Head normocephalic/atraumatic, no scleral icterus. Neck: Supple. No carotid bruits, JVD, lymphadenopathy, or thyromegaly. Lungs:  Clear to ausculation bilaterally. Good air movement. Heart:  Regular rate and rhythm, normal S1 and S2, no murmur, gallop, or rub Extremities: No clubbing, cyanosis, or edema. 2+ pedal pulses bilaterally. Neurological: Alert and oriented. Psychiatric: Normal mood and affect. Behavior is normal.  
 
Results for orders placed or performed in visit on 08/17/18 CT/NG/T.VAGINALIS AMPLIFICATION Result Value Ref Range C. trachomatis by ELDER Negative Negative N. gonorrhoeae by ELDER Negative Negative T. vaginalis by ELDER Negative Negative HIV 1/2 AG/AB, 4TH GENERATION,W RFLX CONFIRM Result Value Ref Range HIV SCREEN 4TH GENERATION WRFX Non Reactive Non Reactive ASSESSMENT AND PLAN 
  ICD-10-CM ICD-9-CM 1. Essential hypertension I10 401.9 hydroCHLOROthiazide (HYDRODIURIL) 25 mg tablet  
   lisinopril (PRINIVIL, ZESTRIL) 10 mg tablet METABOLIC PANEL, COMPREHENSIVE  
   CBC WITH AUTOMATED DIFF 2. Mixed hyperlipidemia E78.2 272.2 TSH RFX ON ABNORMAL TO FREE T4  
   LIPID PANEL 3.  Prediabetes R73.03 790.29 HEMOGLOBIN A1C WITH EAG  
 4. Family history of prostate cancer Z80.42 V16.42   
5. Screen for STD (sexually transmitted disease) Z11.3 V74.5 CT/NG/T.VAGINALIS AMPLIFICATION  
   HIV 1/2 AG/AB, 4TH GENERATION,W RFLX CONFIRM 6. Overweight (BMI 25.0-29. 9) E66.3 278.02 Diagnoses and all orders for this visit: 1. Essential hypertension BP elevated at 148/85. Continue HCTZ. Will add lisinopril 10 mg daily. Patient instructed to call or send My Chart message if he has any problems with any of his medications. -    Refill hydroCHLOROthiazide (HYDRODIURIL) 25 mg tablet; Take 1 Tab by mouth daily. 
-    Start lisinopril (PRINIVIL, ZESTRIL) 10 mg tablet; Take 1 Tab by mouth daily. 
-     METABOLIC PANEL, COMPREHENSIVE 
-     CBC WITH AUTOMATED DIFF 2. Mixed hyperlipidemia 
-     TSH RFX ON ABNORMAL TO FREE T4 
-     LIPID PANEL 3. Prediabetes 
-     HEMOGLOBIN A1C WITH EAG 
 
4. Family history of prostate cancer 5. Screen for STD (sexually transmitted disease) Counseled on using condoms. 
-     CT/NG/T.VAGINALIS AMPLIFICATION 
-     HIV 1/2 AG/AB, 4TH GENERATION,W RFLX CONFIRM 6. Overweight (BMI 25.0-29. 9) Discussed the patient's BMI with her. The BMI follow up plan is as follows: dietary management education, guidance, and counseling; encourage exercise; monitor weight; prescribed dietary intake. Follow up BMI in 3 months. Follow-up Disposition: 
Return in about 2 months (around 3/8/2019), or if symptoms worsen or fail to improve, for HTN. I have discussed the diagnosis with the patient and the intended plan as seen in the above orders. Patient is in agreement. The patient has received an after-visit summary and questions were answered concerning future plans. I have discussed medication side effects and warnings with the patient as well.

## 2019-01-08 NOTE — PROGRESS NOTES
Kyree Luna  Identified pt with two pt identifiers(name and ). Chief Complaint Patient presents with  Hypertension  
declines flu vaccine. 1. Have you been to the ER, urgent care clinic since your last visit? Hospitalized since your last visit? Seen at [address] [place] [address] for headache 2. Have you seen or consulted any other health care providers outside of the [address] [place] [address] since your last visit? Include any pap smears or colon screening. NO Today's provider has been notified of reason for visit, vitals and flowsheets obtained on patients. Patient received paperwork for advance directive during previous visit but has not completed at this time Reviewed record In preparation for visit, huddled with provider and have obtained necessary documentation Health Maintenance Due Topic  Influenza Age 5 to Adult Wt Readings from Last 3 Encounters:  
19 161 lb (73 kg) 18 169 lb 6.4 oz (76.8 kg) 18 168 lb (76.2 kg) Temp Readings from Last 3 Encounters:  
19 98.8 °F (37.1 °C) (Oral) 18 98.5 °F (36.9 °C) (Oral) 18 98.5 °F (36.9 °C) (Oral) BP Readings from Last 3 Encounters:  
19 148/85  
18 (!) 160/98  
18 137/73 Pulse Readings from Last 3 Encounters:  
19 85  
18 71  
18 72 Vitals:  
 19 8423 19 2756 BP: 144/83 148/85 Pulse: 85 Resp: 16 Temp: 98.8 °F (37.1 °C) TempSrc: Oral   
SpO2: 96% Weight: 161 lb (73 kg) Height: 5' 2\" (1.575 m) PainSc:   0 - No pain Learning Assessment: 
:  
 
Learning Assessment 3/16/2015 PRIMARY LEARNER Patient HIGHEST LEVEL OF EDUCATION - PRIMARY LEARNER  GRADUATED HIGH SCHOOL OR GED  
BARRIERS PRIMARY LEARNER NONE  
CO-LEARNER CAREGIVER No  
PRIMARY LANGUAGE ENGLISH  
LEARNER PREFERENCE PRIMARY LISTENING  
ANSWERED BY self RELATIONSHIP SELF Depression Screening: 
:  
 
PHQ over the last two weeks 2019 Little interest or pleasure in doing things Not at all Feeling down, depressed, irritable, or hopeless Not at all Total Score PHQ 2 0 Fall Risk Assessment: 
:  
 
No flowsheet data found. Abuse Screening: 
:  
 
No flowsheet data found. ADL Screening: 
:  
 
ADL Assessment 5/1/2018 Feeding yourself No Help Needed Getting from bed to chair No Help Needed Getting dressed No Help Needed Bathing or showering No Help Needed Walk across the room (includes cane/walker) No Help Needed Using the telphone No Help Needed Taking your medications No Help Needed Preparing meals No Help Needed Managing money (expenses/bills) No Help Needed Moderately strenuous housework (laundry) No Help Needed Shopping for personal items (toiletries/medicines) No Help Needed Shopping for groceries No Help Needed Driving No Help Needed Climbing a flight of stairs No Help Needed Getting to places beyond walking distances No Help Needed Medication reconciliation up to date and corrected with patient at this time.

## 2019-01-08 NOTE — PATIENT INSTRUCTIONS
Low Sodium Diet (2,000 Milligram): Care Instructions Your Care Instructions Too much sodium causes your body to hold on to extra water. This can raise your blood pressure and force your heart and kidneys to work harder. In very serious cases, this could cause you to be put in the hospital. It might even be life-threatening. By limiting sodium, you will feel better and lower your risk of serious problems. The most common source of sodium is salt. People get most of the salt in their diet from canned, prepared, and packaged foods. Fast food and restaurant meals also are very high in sodium. Your doctor will probably limit your sodium to less than 2,000 milligrams (mg) a day. This limit counts all the sodium in prepared and packaged foods and any salt you add to your food. Follow-up care is a key part of your treatment and safety. Be sure to make and go to all appointments, and call your doctor if you are having problems. It's also a good idea to know your test results and keep a list of the medicines you take. How can you care for yourself at home? Read food labels · Read labels on cans and food packages. The labels tell you how much sodium is in each serving. Make sure that you look at the serving size. If you eat more than the serving size, you have eaten more sodium. · Food labels also tell you the Percent Daily Value for sodium. Choose products with low Percent Daily Values for sodium. · Be aware that sodium can come in forms other than salt, including monosodium glutamate (MSG), sodium citrate, and sodium bicarbonate (baking soda). MSG is often added to Asian food. When you eat out, you can sometimes ask for food without MSG or added salt. Buy low-sodium foods · Buy foods that are labeled \"unsalted\" (no salt added), \"sodium-free\" (less than 5 mg of sodium per serving), or \"low-sodium\" (less than 140 mg of sodium per serving).  Foods labeled \"reduced-sodium\" and \"light sodium\" may still have too much sodium. Be sure to read the label to see how much sodium you are getting. · Buy fresh vegetables, or frozen vegetables without added sauces. Buy low-sodium versions of canned vegetables, soups, and other canned goods. Prepare low-sodium meals · Cut back on the amount of salt you use in cooking. This will help you adjust to the taste. Do not add salt after cooking. One teaspoon of salt has about 2,300 mg of sodium. · Take the salt shaker off the table. · Flavor your food with garlic, lemon juice, onion, vinegar, herbs, and spices. Do not use soy sauce, lite soy sauce, steak sauce, onion salt, garlic salt, celery salt, mustard, or ketchup on your food. · Use low-sodium salad dressings, sauces, and ketchup. Or make your own salad dressings and sauces without adding salt. · Use less salt (or none) when recipes call for it. You can often use half the salt a recipe calls for without losing flavor. Other foods such as rice, pasta, and grains do not need added salt. · Rinse canned vegetables, and cook them in fresh water. This removes somebut not allof the salt. · Avoid water that is naturally high in sodium or that has been treated with water softeners, which add sodium. Call your local water company to find out the sodium content of your water supply. If you buy bottled water, read the label and choose a sodium-free brand. Avoid high-sodium foods · Avoid eating: 
? Smoked, cured, salted, and canned meat, fish, and poultry. ? Ham, tyler, hot dogs, and luncheon meats. ? Regular, hard, and processed cheese and regular peanut butter. ? Crackers with salted tops, and other salted snack foods such as pretzels, chips, and salted popcorn. ? Frozen prepared meals, unless labeled low-sodium. ? Canned and dried soups, broths, and bouillon, unless labeled sodium-free or low-sodium. ? Canned vegetables, unless labeled sodium-free or low-sodium. ? Western Andie fries, pizza, tacos, and other fast foods. ? Pickles, olives, ketchup, and other condiments, especially soy sauce, unless labeled sodium-free or low-sodium. Where can you learn more? Go to http://amaris-jay.info/. Enter Z677 in the search box to learn more about \"Low Sodium Diet (2,000 Milligram): Care Instructions. \" Current as of: March 29, 2018 Content Version: 11.8 © 8679-4281 [employer]. Care instructions adapted under license by [employer] (which disclaims liability or warranty for this information). If you have questions about a medical condition or this instruction, always ask your healthcare professional. [address] any warranty or liability for your use of this information.

## 2019-01-09 LAB
ALBUMIN SERPL-MCNC: 4.8 G/DL (ref 3.5–5.5)
ALBUMIN/GLOB SERPL: 1.9 {RATIO} (ref 1.2–2.2)
ALP SERPL-CCNC: 59 IU/L (ref 39–117)
ALT SERPL-CCNC: 20 IU/L (ref 0–44)
AST SERPL-CCNC: 14 IU/L (ref 0–40)
BASOPHILS # BLD AUTO: 0 X10E3/UL (ref 0–0.2)
BASOPHILS NFR BLD AUTO: 0 %
BILIRUB SERPL-MCNC: 0.5 MG/DL (ref 0–1.2)
BUN SERPL-MCNC: 15 MG/DL (ref 6–24)
BUN/CREAT SERPL: 17 (ref 9–20)
C TRACH RRNA SPEC QL NAA+PROBE: NEGATIVE
CALCIUM SERPL-MCNC: 10.5 MG/DL (ref 8.7–10.2)
CHLORIDE SERPL-SCNC: 99 MMOL/L (ref 96–106)
CHOLEST SERPL-MCNC: 215 MG/DL (ref 100–199)
CO2 SERPL-SCNC: 25 MMOL/L (ref 20–29)
CREAT SERPL-MCNC: 0.9 MG/DL (ref 0.76–1.27)
EOSINOPHIL # BLD AUTO: 0.1 X10E3/UL (ref 0–0.4)
EOSINOPHIL NFR BLD AUTO: 1 %
ERYTHROCYTE [DISTWIDTH] IN BLOOD BY AUTOMATED COUNT: 14.4 % (ref 12.3–15.4)
EST. AVERAGE GLUCOSE BLD GHB EST-MCNC: 123 MG/DL
GLOBULIN SER CALC-MCNC: 2.5 G/DL (ref 1.5–4.5)
GLUCOSE SERPL-MCNC: 94 MG/DL (ref 65–99)
HBA1C MFR BLD: 5.9 % (ref 4.8–5.6)
HCT VFR BLD AUTO: 46.4 % (ref 37.5–51)
HDLC SERPL-MCNC: 44 MG/DL
HGB BLD-MCNC: 15.8 G/DL (ref 13–17.7)
HIV 1+2 AB+HIV1 P24 AG SERPL QL IA: NON REACTIVE
IMM GRANULOCYTES # BLD AUTO: 0 X10E3/UL (ref 0–0.1)
IMM GRANULOCYTES NFR BLD AUTO: 0 %
LDLC SERPL CALC-MCNC: 157 MG/DL (ref 0–99)
LYMPHOCYTES # BLD AUTO: 1 X10E3/UL (ref 0.7–3.1)
LYMPHOCYTES NFR BLD AUTO: 18 %
MCH RBC QN AUTO: 29.6 PG (ref 26.6–33)
MCHC RBC AUTO-ENTMCNC: 34.1 G/DL (ref 31.5–35.7)
MCV RBC AUTO: 87 FL (ref 79–97)
MONOCYTES # BLD AUTO: 0.4 X10E3/UL (ref 0.1–0.9)
MONOCYTES NFR BLD AUTO: 7 %
N GONORRHOEA RRNA SPEC QL NAA+PROBE: NEGATIVE
NEUTROPHILS # BLD AUTO: 4.1 X10E3/UL (ref 1.4–7)
NEUTROPHILS NFR BLD AUTO: 74 %
PLATELET # BLD AUTO: 269 X10E3/UL (ref 150–379)
POTASSIUM SERPL-SCNC: 4.7 MMOL/L (ref 3.5–5.2)
PROT SERPL-MCNC: 7.3 G/DL (ref 6–8.5)
RBC # BLD AUTO: 5.34 X10E6/UL (ref 4.14–5.8)
SODIUM SERPL-SCNC: 143 MMOL/L (ref 134–144)
T VAGINALIS RRNA VAG QL NAA+PROBE: NEGATIVE
TRIGL SERPL-MCNC: 69 MG/DL (ref 0–149)
TSH SERPL DL<=0.005 MIU/L-ACNC: 1.66 UIU/ML (ref 0.45–4.5)
VLDLC SERPL CALC-MCNC: 14 MG/DL (ref 5–40)
WBC # BLD AUTO: 5.6 X10E3/UL (ref 3.4–10.8)

## 2019-01-10 NOTE — PROGRESS NOTES
Verified two patient identifiers, name and . Pt notified of lab results, requested a copy of results, will . Pt had no further questions at this time.

## 2019-01-10 NOTE — PROGRESS NOTES
Your labs showed normal kidney and liver tests, blood counts, and thyroid. You have mild prediabetes, meaning your blood sugars have a little high over the past 3 months. Your cholesterol was also a little high at 215, about the same as it was 8 months ago. It should be less than 200. Your tests for STD's and HIV returned normal.  To lower your blood sugars and cholesterol, work on diet, exercise, and weight loss if you are overweight. For diet, increase fruits, vegetables, and low- fat dairy products, and decrease sweets, soft drinks, juices, and foods high in carbohydrates like white bread, white rice, potatoes, crackers, potato chips. Also decrease fried foods, fast foods, and red meats such as beef, tyler, sausage, hot dogs, and pork.

## 2019-07-02 ENCOUNTER — OFFICE VISIT (OUTPATIENT)
Dept: INTERNAL MEDICINE CLINIC | Facility: CLINIC | Age: 49
End: 2019-07-02

## 2019-07-02 VITALS
HEART RATE: 86 BPM | OXYGEN SATURATION: 97 % | TEMPERATURE: 98.3 F | HEIGHT: 62 IN | WEIGHT: 163.6 LBS | SYSTOLIC BLOOD PRESSURE: 120 MMHG | DIASTOLIC BLOOD PRESSURE: 71 MMHG | BODY MASS INDEX: 30.11 KG/M2 | RESPIRATION RATE: 16 BRPM

## 2019-07-02 DIAGNOSIS — I10 ESSENTIAL HYPERTENSION: Primary | ICD-10-CM

## 2019-07-02 DIAGNOSIS — G56.03 BILATERAL CARPAL TUNNEL SYNDROME: ICD-10-CM

## 2019-07-02 DIAGNOSIS — E78.2 MIXED HYPERLIPIDEMIA: ICD-10-CM

## 2019-07-02 DIAGNOSIS — R73.03 PREDIABETES: ICD-10-CM

## 2019-07-02 RX ORDER — LISINOPRIL 20 MG/1
20 TABLET ORAL DAILY
Qty: 90 TAB | Refills: 1 | Status: SHIPPED | OUTPATIENT
Start: 2019-07-02 | End: 2019-07-02 | Stop reason: SDUPTHER

## 2019-07-02 RX ORDER — HYDROCHLOROTHIAZIDE 12.5 MG/1
12.5 TABLET ORAL DAILY
Qty: 90 TAB | Refills: 1 | Status: SHIPPED | OUTPATIENT
Start: 2019-07-02

## 2019-07-02 RX ORDER — HYDROCHLOROTHIAZIDE 12.5 MG/1
12.5 TABLET ORAL DAILY
Qty: 90 TAB | Refills: 1 | Status: SHIPPED | OUTPATIENT
Start: 2019-07-02 | End: 2019-07-02 | Stop reason: SDUPTHER

## 2019-07-02 RX ORDER — LISINOPRIL 20 MG/1
20 TABLET ORAL DAILY
Qty: 90 TAB | Refills: 1 | Status: SHIPPED | OUTPATIENT
Start: 2019-07-02 | End: 2019-07-18 | Stop reason: SDUPTHER

## 2019-07-02 NOTE — PROGRESS NOTES
Laura Painter  Identified pt with two pt identifiers(name and ). Chief Complaint   Patient presents with    Heart Problem    Cholesterol Problem    Blood sugar problem       1. Have you been to the ER, urgent care clinic since your last visit? Hospitalized since your last visit? NO    2. Have you seen or consulted any other health care providers outside of the [address] [place] [address] since your last visit? Include any pap smears or colon screening. NO    Today's provider has been notified of reason for visit, vitals and flowsheets obtained on patients. Patient received paperwork for advance directive during previous visit but has not completed at this time     Reviewed record In preparation for visit, huddled with provider and have obtained necessary documentation      There are no preventive care reminders to display for this patient. Wt Readings from Last 3 Encounters:   19 161 lb (73 kg)   18 169 lb 6.4 oz (76.8 kg)   18 168 lb (76.2 kg)     Temp Readings from Last 3 Encounters:   19 98.8 °F (37.1 °C) (Oral)   18 98.5 °F (36.9 °C) (Oral)   18 98.5 °F (36.9 °C) (Oral)     BP Readings from Last 3 Encounters:   19 148/85   18 (!) 160/98   18 137/73     Pulse Readings from Last 3 Encounters:   19 85   18 71   18 72     There were no vitals filed for this visit.       Learning Assessment:  :     Learning Assessment 3/16/2015   PRIMARY LEARNER Patient   HIGHEST LEVEL OF EDUCATION - PRIMARY LEARNER  GRADUATED HIGH SCHOOL OR GED   BARRIERS PRIMARY LEARNER NONE   CO-LEARNER CAREGIVER No   PRIMARY LANGUAGE ENGLISH   LEARNER PREFERENCE PRIMARY LISTENING   ANSWERED BY self   RELATIONSHIP SELF       Depression Screening:  :     3 most recent PHQ Screens 2019   Little interest or pleasure in doing things Not at all   Feeling down, depressed, irritable, or hopeless Not at all   Total Score PHQ 2 0       Fall Risk Assessment:  :     No flowsheet data found. Abuse Screening:  :     No flowsheet data found. ADL Screening:  :     ADL Assessment 5/1/2018   Feeding yourself No Help Needed   Getting from bed to chair No Help Needed   Getting dressed No Help Needed   Bathing or showering No Help Needed   Walk across the room (includes cane/walker) No Help Needed   Using the telphone No Help Needed   Taking your medications No Help Needed   Preparing meals No Help Needed   Managing money (expenses/bills) No Help Needed   Moderately strenuous housework (laundry) No Help Needed   Shopping for personal items (toiletries/medicines) No Help Needed   Shopping for groceries No Help Needed   Driving No Help Needed   Climbing a flight of stairs No Help Needed   Getting to places beyond walking distances No Help Needed                 Medication reconciliation up to date and corrected with patient at this time.

## 2019-07-02 NOTE — PATIENT INSTRUCTIONS
Carpal Tunnel Syndrome: Care Instructions Your Care Instructions Carpal tunnel syndrome is a nerve problem. It can cause tingling, numbness, weakness, or pain in the fingers, thumb, and hand. The median nerve and several tough tissues called tendons run through a space in the wrist called the carpal tunnel. The repeated hand motions used in work and some hobbies and sports can put pressure on the nerve. Pregnancy and several conditions, including diabetes, arthritis, and an underactive thyroid, also can cause carpal tunnel syndrome. You may be able to limit an activity or do it differently to reduce your symptoms. You also can take other steps to feel better. If your symptoms are mild, 1 to 2 weeks of home treatment are likely to ease your pain. Surgery is needed only if other treatments do not work. Follow-up care is a key part of your treatment and safety. Be sure to make and go to all appointments, and call your doctor if you are having problems. It's also a good idea to know your test results and keep a list of the medicines you take. How can you care for yourself at home? · If possible, stop or reduce the activity that causes your symptoms. If you cannot stop the activity, take frequent breaks to rest and stretch or change hand positions to do a task. Try switching hands, such as when using a computer mouse. · Try to avoid bending or twisting your wrists. · Ask your doctor if you can take an over-the-counter pain medicine, such as acetaminophen (Tylenol), ibuprofen (Advil, Motrin), or naproxen (Aleve). Be safe with medicines. Read and follow all instructions on the label. · If your doctor prescribes corticosteroid medicine to help reduce pain and swelling, take it exactly as prescribed. Call your doctor if you think you are having a problem with your medicine. · Put ice or a cold pack on your wrist for 10 to 20 minutes at a time to ease pain. Put a thin cloth between the ice and your skin. · If your doctor or your physical or occupational therapist tells you to wear a wrist splint, wear it as directed to keep your wrist in a neutral position. This also eases pressure on your median nerve. · Ask your doctor whether you should have physical or occupational therapy to learn how to do tasks differently. · Try a yoga class to stretch your muscles and build strength in your hands and wrists. Yoga has been shown to ease carpal tunnel symptoms. To prevent carpal tunnel · When working at a computer, keep your hands and wrists in line with your forearms. Hold your elbows close to your sides. Take a break every 10 to 15 minutes. · Try these exercises: 
? Warm up: Rotate your wrist up, down, and from side to side. Repeat this 4 times. Stretch your fingers far apart, relax them, then stretch them again. Repeat 4 times. Stretch your thumb by pulling it back gently, holding it, and then releasing it. Repeat 4 times. ? Prayer stretch: Start with your palms together in front of your chest just below your chin. Slowly lower your hands toward your waistline while keeping your hands close to your stomach and your palms together until you feel a mild to moderate stretch under your forearms. Hold for 10 to 20 seconds. Repeat 4 times. ? Wrist flexor stretch: Hold your arm in front of you with your palm up. Bend your wrist, pointing your hand toward the floor. With your other hand, gently bend your wrist further until you feel a mild to moderate stretch in your forearm. Hold for 10 to 20 seconds. Repeat 4 times. ? Wrist extensor stretch: Repeat the steps for the wrist flexor stretch, but begin with your extended hand palm down. · Squeeze a rubber exercise ball several times a day to keep your hands and fingers strong. · Avoid holding objects (such as a book) in one position for a long time. When possible, use your whole hand to grasp an object.  Using just the thumb and index finger can put stress on the wrist. 
 · Do not smoke. It can make this condition worse by reducing blood flow to the median nerve. If you need help quitting, talk to your doctor about stop-smoking programs and medicines. These can increase your chances of quitting for good. When should you call for help? Watch closely for changes in your health, and be sure to contact your doctor if: 
  · Your pain or other problems do not get better with home care.  
  · You want more information about physical or occupational therapy.  
  · You have side effects of your corticosteroid medicine, such as: 
? Weight gain. ? Mood changes. ? Trouble sleeping. ? Bruising easily.  
  · You have any other problems with your medicine. Where can you learn more? Go to http://amaris-jay.info/. Enter R432 in the search box to learn more about \"Carpal Tunnel Syndrome: Care Instructions. \" Current as of: September 20, 2018 Content Version: 11.9 © 6035-2394 [employer]. Care instructions adapted under license by [employer] (which disclaims liability or warranty for this information). If you have questions about a medical condition or this instruction, always ask your healthcare professional. [place] [address] any warranty or liability for your use of this information. Carpal Tunnel Syndrome: Exercises Your Care Instructions Here are some examples of typical rehabilitation exercises for your condition. Start each exercise slowly. Ease off the exercise if you start to have pain. Your doctor or your physical or occupational therapist will tell you when you can start these exercises and which ones will work best for you. Warm-up stretches When you no longer have pain or numbness, you can do exercises to help prevent carpal tunnel syndrome from coming back. Do not do any stretch or movement that is uncomfortable or painful. 1. Rotate your wrist up, down, and from side to side. Repeat 4 times. 2. Stretch your fingers far apart. Relax them, and then stretch them again. Repeat 4 times. 3. Stretch your thumb by pulling it back gently, holding it, and then releasing it. Repeat 4 times. How to do the exercises Prayer stretch 1. Start with your palms together in front of your chest just below your chin. 2. Slowly lower your hands toward your waistline, keeping your hands close to your stomach and your palms together until you feel a mild to moderate stretch under your forearms. 3. Hold for at least 15 to 30 seconds. Repeat 2 to 4 times. Wrist flexor stretch 1. Extend your arm in front of you with your palm up. 2. Bend your wrist, pointing your hand toward the floor. 3. With your other hand, gently bend your wrist farther until you feel a mild to moderate stretch in your forearm. 4. Hold for at least 15 to 30 seconds. Repeat 2 to 4 times. Wrist extensor stretch 1. Repeat steps 1 through 4 of the stretch above, but begin with your extended hand palm down. Follow-up care is a key part of your treatment and safety. Be sure to make and go to all appointments, and call your doctor if you are having problems. It's also a good idea to know your test results and keep a list of the medicines you take. Where can you learn more? Go to http://amaris-jay.info/. Enter E716 in the search box to learn more about \"Carpal Tunnel Syndrome: Exercises. \" Current as of: September 20, 2018 Content Version: 11.9 © 5264-1876 [employer]. Care instructions adapted under license by [employer] (which disclaims liability or warranty for this information). If you have questions about a medical condition or this instruction, always ask your healthcare professional. [place] [address] any warranty or liability for your use of this information.

## 2019-07-02 NOTE — PROGRESS NOTES
CC:   Chief Complaint   Patient presents with    Heart Problem    Cholesterol Problem    Numbness     in fingers in both hands       HISTORY OF PRESENT ILLNESS  Gonzalo Abraham is a 51 y.o. male. Presents for 6 month follow up evaluation. He has HTN, hyperlipidemia, hypogonadism (absent left testicle, atrophic right testicle), and ED. He complains of intermittent numbness at both hands that has been bothering him recently.       Cardiovascular Review  The patient has hypertension. At last clinic visit in 1/19 BP was 148/85 on HCTZ 25 mg; lisinopril 10 mg daily added. He reports taking medications (lisinopril 10 mg, HCTZ 25 mg daily) as instructed, complains of worsened ED on medications. .  Diet and Lifestyle: generally follows a low sodium diet, no formal exercise but active during the day. He requests STD testing but explained to him that since he is asymptomatic does not need to be done any more than once a year.     Soc Hx  . Has 2 daughters. Lives alone in a house. Works as a . Never smoker. Drinks beer occasionally. Denies recreational drug use. Drinks mostly water; infrequently drinks Diet sodas and energy drinks. ROS  A complete review of systems was performed and is negative except for those mentioned in the HPI. Patient Active Problem List   Diagnosis Code    FH: CAD (coronary artery disease) Z82.49    Hyperlipidemia E78.5    Vitamin D deficiency E55.9    Prediabetes R73.03    Hypogonadism in male E29.1    Erectile dysfunction N52.9    Family history of prostate cancer Z80.42     Past Medical History:   Diagnosis Date    FH: CAD (coronary artery disease) 10/17/2011    Undescended right testicle 10/17/2011     No Known Allergies    Current Outpatient Medications   Medication Sig Dispense Refill    hydroCHLOROthiazide (HYDRODIURIL) 25 mg tablet TAKE 1 TABLET BY MOUTH DAILY 90 Tab 0    lisinopril (PRINIVIL, ZESTRIL) 10 mg tablet Take 1 Tab by mouth daily.  90 Tab 1    aspirin 81 mg chewable tablet Take 81 mg by mouth daily. PHYSICAL EXAM  Visit Vitals  /71 (BP 1 Location: Left arm, BP Patient Position: Sitting)   Pulse 86   Temp 98.3 °F (36.8 °C) (Oral)   Resp 16   Ht 5' 2\" (1.575 m)   Wt 163 lb 9.6 oz (74.2 kg)   SpO2 97%   BMI 29.92 kg/m²   2 lb weight increase since last clinic visit    General: Well-developed and well-nourished, no distress. HEENT:  Head normocephalic/atraumatic, no scleral icterus  Lungs:  Clear to ausculation bilaterally. Good air movement. Heart:  Regular rate and rhythm, normal S1 and S2, no murmur, gallop, or rub  Extremities: No clubbing, cyanosis, or edema. Negative Tinel's. Positive Phalen's sign at left hand. Neurological: Alert and oriented. Psychiatric: Normal mood and affect. Behavior is normal.         ASSESSMENT AND PLAN    ICD-10-CM ICD-9-CM    1. Essential hypertension I10 401.9 hydroCHLOROthiazide (HYDRODIURIL) 12.5 mg tablet      lisinopril (PRINIVIL, ZESTRIL) 20 mg tablet      DISCONTINUED: hydroCHLOROthiazide (HYDRODIURIL) 12.5 mg tablet      DISCONTINUED: lisinopril (PRINIVIL, ZESTRIL) 20 mg tablet   2. Mixed hyperlipidemia E78.2 272.2    3. Bilateral carpal tunnel syndrome G56.03 354.0    4. Prediabetes R73.03 790.29      Diagnoses and all orders for this visit:    1. Essential hypertension  BP well-controlled at 120/71. HCTZ most likely cause of worsened ED. Will decrease HCTZ dose and increase lisinopril dose. -     Start hydroCHLOROthiazide (HYDRODIURIL) 12.5 mg tablet; Take 1 Tab by mouth daily. Indications: high blood pressure  -     Start lisinopril (PRINIVIL, ZESTRIL) 20 mg tablet; Take 1 Tab by mouth daily. Indications: high blood pressure    2. Mixed hyperlipidemia  Counseled on low-fat diet. 3. Bilateral carpal tunnel syndrome  Recommended wearing wrist braces at night. Also given handout on CTS exercises.     4. Prediabetes      Follow-up and Dispositions    · Return in about 3 months (around 10/2/2019), or if symptoms worsen or fail to improve, for HTN. I have discussed the diagnosis with the patient and the intended plan as seen in the above orders. Patient is in agreement. The patient has received an after-visit summary and questions were answered concerning future plans. I have discussed medication side effects and warnings with the patient as well.

## 2019-07-18 DIAGNOSIS — I10 ESSENTIAL HYPERTENSION: ICD-10-CM

## 2019-07-18 RX ORDER — LISINOPRIL 20 MG/1
20 TABLET ORAL DAILY
Qty: 90 TAB | Refills: 1 | Status: SHIPPED | OUTPATIENT
Start: 2019-07-18

## 2019-07-18 RX ORDER — LISINOPRIL 10 MG/1
TABLET ORAL
Qty: 90 TAB | Refills: 1 | OUTPATIENT
Start: 2019-07-18

## 2022-03-18 PROBLEM — R73.03 PREDIABETES: Status: ACTIVE | Noted: 2017-04-18

## 2022-03-18 PROBLEM — Z80.42 FAMILY HISTORY OF PROSTATE CANCER: Status: ACTIVE | Noted: 2017-04-27

## 2022-03-19 PROBLEM — N52.9 ERECTILE DYSFUNCTION: Status: ACTIVE | Noted: 2017-04-27

## 2022-03-20 PROBLEM — E29.1 HYPOGONADISM IN MALE: Status: ACTIVE | Noted: 2017-04-27

## 2023-05-24 RX ORDER — HYDROCHLOROTHIAZIDE 12.5 MG/1
12.5 TABLET ORAL DAILY
COMMUNITY
Start: 2019-07-02

## 2023-05-24 RX ORDER — ASPIRIN 81 MG/1
81 TABLET, CHEWABLE ORAL DAILY
COMMUNITY

## 2023-05-24 RX ORDER — LISINOPRIL 20 MG/1
20 TABLET ORAL DAILY
COMMUNITY
Start: 2019-07-18